# Patient Record
Sex: FEMALE | URBAN - METROPOLITAN AREA
[De-identification: names, ages, dates, MRNs, and addresses within clinical notes are randomized per-mention and may not be internally consistent; named-entity substitution may affect disease eponyms.]

---

## 2023-09-08 ENCOUNTER — APPOINTMENT (OUTPATIENT)
Dept: URBAN - METROPOLITAN AREA CLINIC 193 | Age: 63
Setting detail: DERMATOLOGY
End: 2023-09-08

## 2023-09-08 DIAGNOSIS — Z71.89 OTHER SPECIFIED COUNSELING: ICD-10-CM

## 2023-09-08 DIAGNOSIS — L81.4 OTHER MELANIN HYPERPIGMENTATION: ICD-10-CM

## 2023-09-08 DIAGNOSIS — D485 NEOPLASM OF UNCERTAIN BEHAVIOR OF SKIN: ICD-10-CM

## 2023-09-08 DIAGNOSIS — L57.0 ACTINIC KERATOSIS: ICD-10-CM

## 2023-09-08 DIAGNOSIS — L57.8 OTHER SKIN CHANGES DUE TO CHRONIC EXPOSURE TO NONIONIZING RADIATION: ICD-10-CM

## 2023-09-08 DIAGNOSIS — L82.1 OTHER SEBORRHEIC KERATOSIS: ICD-10-CM

## 2023-09-08 DIAGNOSIS — D22 MELANOCYTIC NEVI: ICD-10-CM

## 2023-09-08 PROBLEM — D48.5 NEOPLASM OF UNCERTAIN BEHAVIOR OF SKIN: Status: ACTIVE | Noted: 2023-09-08

## 2023-09-08 PROBLEM — D22.61 MELANOCYTIC NEVI OF RIGHT UPPER LIMB, INCLUDING SHOULDER: Status: ACTIVE | Noted: 2023-09-08

## 2023-09-08 PROCEDURE — 17003 DESTRUCT PREMALG LES 2-14: CPT

## 2023-09-08 PROCEDURE — OTHER MIPS QUALITY: OTHER

## 2023-09-08 PROCEDURE — 11102 TANGNTL BX SKIN SINGLE LES: CPT

## 2023-09-08 PROCEDURE — OTHER COUNSELING: OTHER

## 2023-09-08 PROCEDURE — OTHER BIOPSY BY SHAVE METHOD: OTHER

## 2023-09-08 PROCEDURE — 99203 OFFICE O/P NEW LOW 30 MIN: CPT | Mod: 25

## 2023-09-08 PROCEDURE — 17000 DESTRUCT PREMALG LESION: CPT | Mod: 59

## 2023-09-08 PROCEDURE — OTHER LIQUID NITROGEN: OTHER

## 2023-09-08 ASSESSMENT — LOCATION DETAILED DESCRIPTION DERM
LOCATION DETAILED: LEFT NASAL DORSUM
LOCATION DETAILED: LEFT ULNAR DORSAL HAND
LOCATION DETAILED: LEFT MEDIAL SUPERIOR CHEST
LOCATION DETAILED: LEFT INFERIOR ANTERIOR NECK
LOCATION DETAILED: RIGHT ANTERIOR PROXIMAL UPPER ARM
LOCATION DETAILED: LEFT PROXIMAL PRETIBIAL REGION
LOCATION DETAILED: RIGHT SUPERIOR UPPER BACK
LOCATION DETAILED: LEFT FOREHEAD

## 2023-09-08 ASSESSMENT — LOCATION ZONE DERM
LOCATION ZONE: TRUNK
LOCATION ZONE: FACE
LOCATION ZONE: HAND
LOCATION ZONE: NECK
LOCATION ZONE: LEG
LOCATION ZONE: ARM
LOCATION ZONE: NOSE

## 2023-09-08 ASSESSMENT — LOCATION SIMPLE DESCRIPTION DERM
LOCATION SIMPLE: LEFT ANTERIOR NECK
LOCATION SIMPLE: RIGHT UPPER BACK
LOCATION SIMPLE: LEFT FOREHEAD
LOCATION SIMPLE: LEFT HAND
LOCATION SIMPLE: CHEST
LOCATION SIMPLE: LEFT PRETIBIAL REGION
LOCATION SIMPLE: RIGHT UPPER ARM
LOCATION SIMPLE: NOSE

## 2023-09-08 NOTE — PROCEDURE: LIQUID NITROGEN
Show Applicator Variable?: Yes
Render Note In Bullet Format When Appropriate: No
Application Tool (Optional): Cry-AC
Post-Care Instructions: I reviewed with the patient in detail post-care instructions. Patient is to wear sunprotection, and avoid picking at any of the treated lesions. Pt may apply Vaseline to crusted or scabbing areas.
Duration Of Freeze Thaw-Cycle (Seconds): 3
Number Of Freeze-Thaw Cycles: 1 freeze-thaw cycle
Detail Level: Detailed
Consent: The patient's consent was obtained including but not limited to risks of crusting, scabbing, blistering, scarring, darker or lighter pigmentary change, recurrence, incomplete removal and infection.

## 2023-09-08 NOTE — PROCEDURE: BIOPSY BY SHAVE METHOD
Detail Level: Detailed
Depth Of Biopsy: dermis
Was A Bandage Applied: Yes
Size Of Lesion In Cm: 0.7
X Size Of Lesion In Cm: 0
Biopsy Type: H and E
Biopsy Method: Dermablade
Anesthesia Type: 0.5% lidocaine with 1:200,000 epinephrine and a 1:10 solution of 8.4% sodium bicarbonate
Anesthesia Volume In Cc (Will Not Render If 0): 0.2
Hemostasis: Electrocautery and Aluminum Chloride
Wound Care: Petrolatum
Dressing: bandage
Destruction After The Procedure: No
Type Of Destruction Used: Curettage
Curettage Text: The wound bed was treated with curettage after the biopsy was performed.
Cryotherapy Text: The wound bed was treated with cryotherapy after the biopsy was performed.
Electrodesiccation Text: The wound bed was treated with electrodesiccation after the biopsy was performed.
Electrodesiccation And Curettage Text: The wound bed was treated with electrodesiccation and curettage after the biopsy was performed.
Silver Nitrate Text: The wound bed was treated with silver nitrate after the biopsy was performed.
Lab: [id number]
Consent: Written consent was obtained and risks were reviewed including but not limited to scarring, infection, bleeding, scabbing, incomplete removal, nerve damage and allergy to anesthesia.
Post-Care Instructions: I reviewed with the patient in detail post-care instructions. Patient is to keep the biopsy site dry overnight, and then apply bacitracin twice daily until healed. Patient may apply hydrogen peroxide soaks to remove any crusting.
Notification Instructions: Patient will be notified of biopsy results. However, patient instructed to call the office if not contacted within 2 weeks.
Billing Type: Third-Party Bill
Information: Selecting Yes will display possible errors in your note based on the variables you have selected. This validation is only offered as a suggestion for you. PLEASE NOTE THAT THE VALIDATION TEXT WILL BE REMOVED WHEN YOU FINALIZE YOUR NOTE. IF YOU WANT TO FAX A PRELIMINARY NOTE YOU WILL NEED TO TOGGLE THIS TO 'NO' IF YOU DO NOT WANT IT IN YOUR FAXED NOTE.

## 2023-11-03 ENCOUNTER — APPOINTMENT (OUTPATIENT)
Dept: RADIOLOGY | Facility: CLINIC | Age: 63
End: 2023-11-03
Payer: COMMERCIAL

## 2023-11-03 ENCOUNTER — OFFICE VISIT (OUTPATIENT)
Dept: URGENT CARE | Facility: CLINIC | Age: 63
End: 2023-11-03
Payer: COMMERCIAL

## 2023-11-03 VITALS
OXYGEN SATURATION: 100 % | BODY MASS INDEX: 24.33 KG/M2 | HEART RATE: 81 BPM | HEIGHT: 67 IN | RESPIRATION RATE: 20 BRPM | SYSTOLIC BLOOD PRESSURE: 168 MMHG | WEIGHT: 155 LBS | TEMPERATURE: 98.8 F | DIASTOLIC BLOOD PRESSURE: 78 MMHG

## 2023-11-03 DIAGNOSIS — S69.92XA INJURY OF LEFT WRIST, INITIAL ENCOUNTER: ICD-10-CM

## 2023-11-03 DIAGNOSIS — S52.502A CLOSED FRACTURE OF DISTAL END OF LEFT RADIUS, UNSPECIFIED FRACTURE MORPHOLOGY, INITIAL ENCOUNTER: Primary | ICD-10-CM

## 2023-11-03 PROCEDURE — 73110 X-RAY EXAM OF WRIST: CPT

## 2023-11-03 PROCEDURE — 99203 OFFICE O/P NEW LOW 30 MIN: CPT | Performed by: FAMILY MEDICINE

## 2023-11-03 RX ORDER — DIAZEPAM 5 MG/1
5 TABLET ORAL
COMMUNITY

## 2023-11-03 RX ORDER — GABAPENTIN 300 MG/1
600 CAPSULE ORAL 3 TIMES DAILY
COMMUNITY

## 2023-11-03 RX ORDER — GABAPENTIN 600 MG/1
600 TABLET ORAL DAILY
COMMUNITY

## 2023-11-03 RX ORDER — LEVOTHYROXINE SODIUM 112 UG/1
112 TABLET ORAL
COMMUNITY

## 2023-11-03 RX ORDER — LEVOTHYROXINE SODIUM 0.12 MG/1
125 TABLET ORAL
COMMUNITY

## 2023-11-03 RX ORDER — AMLODIPINE BESYLATE 10 MG/1
10 TABLET ORAL DAILY
COMMUNITY

## 2023-11-03 RX ORDER — AZELASTINE 1 MG/ML
1 SPRAY, METERED NASAL 2 TIMES DAILY
COMMUNITY

## 2023-11-03 RX ORDER — CETIRIZINE HYDROCHLORIDE 10 MG/1
10 TABLET ORAL DAILY
COMMUNITY

## 2023-11-03 RX ORDER — DULOXETIN HYDROCHLORIDE 60 MG/1
CAPSULE, DELAYED RELEASE ORAL
COMMUNITY
Start: 2023-10-24

## 2023-11-03 RX ORDER — DESLORATADINE 5 MG/1
5 TABLET ORAL DAILY
COMMUNITY

## 2023-11-03 RX ORDER — LOSARTAN POTASSIUM 100 MG/1
100 TABLET ORAL DAILY
COMMUNITY

## 2023-11-03 RX ORDER — FAMOTIDINE 20 MG/1
1 TABLET, FILM COATED ORAL
COMMUNITY

## 2023-11-03 RX ORDER — ESOMEPRAZOLE MAGNESIUM 40 MG/1
40 CAPSULE, DELAYED RELEASE ORAL
COMMUNITY

## 2023-11-03 RX ORDER — DULOXETIN HYDROCHLORIDE 30 MG/1
30 CAPSULE, DELAYED RELEASE ORAL DAILY
COMMUNITY

## 2023-11-03 RX ORDER — CYCLOBENZAPRINE HCL 5 MG
TABLET ORAL
COMMUNITY
Start: 2023-09-08

## 2023-11-03 NOTE — PATIENT INSTRUCTIONS
Keep splint on until seen by ortho. Follow up with ortho in the next week. Treat splint like a cast.  Do not remove. Do not get wet. Tylenol as needed. Ibuprofen if swollen. Elevate to reduce swelling. Can apply ice.

## 2023-11-03 NOTE — PROGRESS NOTES
[facility]    NAME: Chuckie Avendaño is a 58 y.o. female  : 1960    MRN: [id number]  DATE: November 3, 2023  TIME: 7:01 PM    Assessment and Plan   Closed fracture of distal end of left radius, unspecified fracture morphology, initial encounter [S52.502A]  1. Closed fracture of distal end of left radius, unspecified fracture morphology, initial encounter  XR wrist 3+ vw left    Ambulatory Referral to Orthopedic Surgery    CANCELED: XR wrist 3+ vw left          Patient Instructions     Patient Instructions   Keep splint on until seen by ortho. Follow up with ortho in the next week. Treat splint like a cast.  Do not remove. Do not get wet. Tylenol as needed. Ibuprofen if swollen. Elevate to reduce swelling. Can apply ice. Chief Complaint     Chief Complaint   Patient presents with    Wrist Pain     Left wrist pain. Had a fall today around 330 while walking in woods behind house, fell on wrist- heard a snap. Pain 10/10        History of Present Illness   58year old female here with complaint of injury to left wrist.  [employer] in the jones on out stretched hand. Pain swelling of left wrist.         Review of Systems   Review of Systems   Constitutional:  Negative for chills and fever. Respiratory:  Negative for cough and shortness of breath.     Musculoskeletal:         Left wrist with deformity, swelling, pain       Current Medications     Current Outpatient Medications:     amLODIPine (NORVASC) 10 mg tablet, Take 10 mg by mouth daily, Disp: , Rfl:     azelastine (ASTELIN) 0.1 % nasal spray, 1 spray into each nostril 2 (two) times a day, Disp: , Rfl:     cetirizine (ZyrTEC) 10 mg tablet, Take 10 mg by mouth daily, Disp: , Rfl:     cyanocobalamin (VITAMIN B-12) 2500 MCG TABS, Take 2,500 mcg by mouth daily, Disp: , Rfl:     cyclobenzaprine (FLEXERIL) 5 mg tablet, , Disp: , Rfl:     desloratadine (CLARINEX) 5 MG tablet, Take 5 mg by mouth daily, Disp: , Rfl:     diazepam (VALIUM) 5 mg tablet, Take 5 mg by mouth, Disp: , Rfl:     DULoxetine (CYMBALTA) 30 mg delayed release capsule, Take 30 mg by mouth daily, Disp: , Rfl:     DULoxetine (CYMBALTA) 60 mg delayed release capsule, , Disp: , Rfl:     esomeprazole (NexIUM) 40 MG capsule, Take 40 mg by mouth, Disp: , Rfl:     famotidine (PEPCID) 20 mg tablet, Take 1 tablet by mouth, Disp: , Rfl:     gabapentin (NEURONTIN) 300 mg capsule, Take 600 mg by mouth Three times a day, Disp: , Rfl:     gabapentin (NEURONTIN) 600 MG tablet, Take 600 mg by mouth daily, Disp: , Rfl:     levothyroxine 112 mcg tablet, Take 112 mcg by mouth, Disp: , Rfl:     levothyroxine 125 mcg tablet, Take 125 mcg by mouth, Disp: , Rfl:     losartan (COZAAR) 100 MG tablet, Take 100 mg by mouth daily, Disp: , Rfl:     Current Allergies     Allergies as of 11/03/2023 - Reviewed 11/03/2023   Allergen Reaction Noted    Fentanyl GI Intolerance 01/12/2021    Codeine Other (See Comments) 02/22/2018    Belladonna Hives 05/27/2015    Chlorhexidine Hives, Itching, and Rash 09/21/2019    Other Hives 01/29/2018    Penicillins Hives 04/10/2003          The following portions of the patient's history were reviewed and updated as appropriate: allergies, current medications, past family history, past medical history, past social history, past surgical history and problem list.   History reviewed. No pertinent past medical history. Past Surgical History:   Procedure Laterality Date    CERVICAL FUSION  2023     History reviewed. No pertinent family history.   Social History     Socioeconomic History    Marital status: /Civil Union     Spouse name: Not on file    Number of children: Not on file    Years of education: Not on file    Highest education level: Not on file   Occupational History    Not on file   Tobacco Use    Smoking status: Never    Smokeless tobacco: Never   Substance and Sexual Activity    Alcohol use: Yes     Comment: social    Drug use: Yes     Comment: medical mj    Sexual activity: Not on file   Other Topics Concern    Not on file   Social History Narrative    Not on file     Social Determinants of Health     Financial Resource Strain: Not on file   Food Insecurity: Not on file   Transportation Needs: Not on file   Physical Activity: Not on file   Stress: Not on file   Social Connections: Not on file   Intimate Partner Violence: Not on file   Housing Stability: Not on file     Medications have been verified. Objective   /78   Pulse 81   Temp 98.8 °F (37.1 °C)   Resp 20   Ht 5' 7" (1.702 m)   Wt 70.3 kg (155 lb)   SpO2 100%   BMI 24.28 kg/m²      Physical Exam   Physical Exam  Vitals and nursing note reviewed. Constitutional:       Appearance: Normal appearance. HENT:      Head: Normocephalic and atraumatic. Cardiovascular:      Rate and Rhythm: Normal rate. Pulses: Normal pulses. Pulmonary:      Effort: Pulmonary effort is normal.   Musculoskeletal:      Left wrist: Swelling, deformity and tenderness present. Decreased range of motion. Normal pulse. Comments: Sensation intact to light touch grossly   Neurological:      Mental Status: She is alert.

## 2023-11-06 ENCOUNTER — APPOINTMENT (OUTPATIENT)
Dept: RADIOLOGY | Facility: CLINIC | Age: 63
End: 2023-11-06
Payer: COMMERCIAL

## 2023-11-06 ENCOUNTER — OFFICE VISIT (OUTPATIENT)
Dept: OBGYN CLINIC | Facility: CLINIC | Age: 63
End: 2023-11-06
Payer: COMMERCIAL

## 2023-11-06 VITALS
WEIGHT: 155 LBS | TEMPERATURE: 98.6 F | SYSTOLIC BLOOD PRESSURE: 135 MMHG | HEIGHT: 67 IN | BODY MASS INDEX: 24.33 KG/M2 | HEART RATE: 108 BPM | DIASTOLIC BLOOD PRESSURE: 88 MMHG

## 2023-11-06 DIAGNOSIS — S59.912A FOREARM INJURY, LEFT, INITIAL ENCOUNTER: ICD-10-CM

## 2023-11-06 DIAGNOSIS — S52.612A TRAUMATIC CLOSED FRACTURE OF ULNAR STYLOID WITH MINIMAL DISPLACEMENT, LEFT, INITIAL ENCOUNTER: ICD-10-CM

## 2023-11-06 DIAGNOSIS — S69.91XA HAND INJURY, RIGHT, INITIAL ENCOUNTER: ICD-10-CM

## 2023-11-06 DIAGNOSIS — S52.502A CLOSED FRACTURE OF DISTAL END OF LEFT RADIUS, UNSPECIFIED FRACTURE MORPHOLOGY, INITIAL ENCOUNTER: Primary | ICD-10-CM

## 2023-11-06 PROCEDURE — 73130 X-RAY EXAM OF HAND: CPT

## 2023-11-06 PROCEDURE — 99204 OFFICE O/P NEW MOD 45 MIN: CPT | Performed by: FAMILY MEDICINE

## 2023-11-06 PROCEDURE — 73090 X-RAY EXAM OF FOREARM: CPT

## 2023-11-06 NOTE — PROGRESS NOTES
[facility] [address] [address] [phone]      Assessment:  1. Closed fracture of distal end of left radius, unspecified fracture morphology, initial encounter  -     Ambulatory Referral to Orthopedic Surgery  -     DXA bone density spine hip and pelvis; Future; Expected date: 11/06/2023  -     Ambulatory Referral to Orthopedic Surgery; Future    2. Hand injury, right, initial encounter  -     XR hand 3+ vw right; Future; Expected date: 11/06/2023  -     DXA bone density spine hip and pelvis; Future; Expected date: 11/06/2023  -     Brace  -     Ambulatory Referral to Orthopedic Surgery; Future    3. Forearm injury, left, initial encounter  -     DXA bone density spine hip and pelvis; Future; Expected date: 11/06/2023  -     XR forearm 2 vw left; Future; Expected date: 11/06/2023  -     Ambulatory Referral to Orthopedic Surgery; Future    4. Traumatic closed fracture of ulnar styloid with minimal displacement, left, initial encounter  -     Ambulatory Referral to Orthopedic Surgery; Future        Plan:  Patient Instructions   F/u here as needed  Ortho hand referral- Dr. Tyrone Nuñez wearing L splint  R  ulnar gutter brace- possible 4th metacarpal base fx  Icing/heat/OTC pain meds as needed. Return for referral to Dr. Maria Esther Guardado- ortho hand, [address] [place] [address]. Chief Complaint:  Chief Complaint   Patient presents with    Left Wrist - Fracture       Subjective:   HPI    Patient ID: Jadyn De Jesus is a 58 y.o. female     Here c/o B hand pain  She was seen in . XR done. Splinted. Note reviewed  11/3/23 she was walking in woods and tripped over branch and fell on both hands. [place] L snap  Also having R hand pain as wells  Sharp pain with movement/constant  R hand- pain with extension of hand/sharp pain  Taking tylenol PRN  Took dilaudid- left over meds. LEFT WRIST     INDICATION:   S69. 92XA: Unspecified injury of left wrist, hand and finger(s), initial encounter. COMPARISON:  None     VIEWS:  XR WRIST 3+ VW LEFT  Images: 4     FINDINGS:     Acute comminuted fracture of the distal radius. There is mild dorsal angulation. There is a fracture in the ulnar styloid. Narrowing in the triscaphe joint. Narrowing at the first carpal metacarpal articulation. No lytic or blastic osseous lesion. Soft tissues are unremarkable. IMPRESSION:  Acute Colles' fracture. Review of Systems   Constitutional:  Negative for fatigue and fever. Respiratory:  Negative for shortness of breath. Cardiovascular:  Negative for chest pain. Gastrointestinal:  Negative for abdominal pain and nausea. Genitourinary:  Negative for dysuria. Musculoskeletal:  Positive for arthralgias. Skin:  Negative for rash and wound. Neurological:  Negative for weakness and headaches. Objective:  Vitals:  /88 (BP Location: Left arm, Patient Position: Sitting, Cuff Size: Standard)   Pulse (!) 108   Temp 98.6 °F (37 °C) (Tympanic)   Ht 5' 7" (1.702 m)   Wt 70.3 kg (155 lb)   BMI 24.28 kg/m²     The following portions of the patient's history were reviewed and updated as appropriate: allergies, current medications, past family history, past medical history, past social history, past surgical history, and problem list.    Physical exam:  Physical Exam  Constitutional:       Appearance: Normal appearance. She is normal weight. Eyes:      Extraocular Movements: Extraocular movements intact. Pulmonary:      Effort: Pulmonary effort is normal.   Musculoskeletal:         General: Tenderness present. Cervical back: Normal range of motion. Comments: R distal radius/ulna/ prox radius TTP  Swelling  NVI  L 4th prox metacarpal TTP  NVI   Skin:     General: Skin is warm and dry. Neurological:      General: No focal deficit present. Mental Status: She is alert and oriented to person, place, and time. Mental status is at baseline. Psychiatric:         Mood and Affect: Mood normal.         Behavior: Behavior normal.         Thought Content: Thought content normal.         Judgment: Judgment normal.       Ortho Exam    I have personally reviewed pertinent films in PACS and my interpretation is XR-  L wrist- comminuted/angulated distal radius fx.ulnar styolid fx. R hand- possible 4th prox metacarpal base fx?       Smita Kelly MD

## 2023-11-06 NOTE — PATIENT INSTRUCTIONS
F/u here as needed  Ortho hand referral- Dr. Elana Walsh wearing L splint  R  ulnar gutter brace- possible 4th metacarpal base fx  Icing/heat/OTC pain meds as needed.

## 2023-11-08 ENCOUNTER — OFFICE VISIT (OUTPATIENT)
Dept: OBGYN CLINIC | Facility: HOSPITAL | Age: 63
End: 2023-11-08
Payer: COMMERCIAL

## 2023-11-08 VITALS
SYSTOLIC BLOOD PRESSURE: 118 MMHG | HEIGHT: 67 IN | DIASTOLIC BLOOD PRESSURE: 82 MMHG | BODY MASS INDEX: 24.36 KG/M2 | HEART RATE: 93 BPM | OXYGEN SATURATION: 98 % | WEIGHT: 155.2 LBS

## 2023-11-08 DIAGNOSIS — S69.91XA HAND INJURY, RIGHT, INITIAL ENCOUNTER: ICD-10-CM

## 2023-11-08 DIAGNOSIS — G56.02 CARPAL TUNNEL SYNDROME ON LEFT: Primary | ICD-10-CM

## 2023-11-08 DIAGNOSIS — S52.502A CLOSED FRACTURE OF DISTAL END OF LEFT RADIUS, UNSPECIFIED FRACTURE MORPHOLOGY, INITIAL ENCOUNTER: ICD-10-CM

## 2023-11-08 DIAGNOSIS — S52.612A TRAUMATIC CLOSED FRACTURE OF ULNAR STYLOID WITH MINIMAL DISPLACEMENT, LEFT, INITIAL ENCOUNTER: ICD-10-CM

## 2023-11-08 PROCEDURE — 99204 OFFICE O/P NEW MOD 45 MIN: CPT | Performed by: ORTHOPAEDIC SURGERY

## 2023-11-08 RX ORDER — ZOLPIDEM TARTRATE 12.5 MG/1
12.5 TABLET, FILM COATED, EXTENDED RELEASE ORAL
COMMUNITY

## 2023-11-08 NOTE — PROGRESS NOTES
ASSESSMENT/PLAN:    Assessment:   Fracture left distal radius and ulnar styloid with dorsal angulation, intra-articular in nature, loss of radial height. Carpal tunnel syndrome chronic    Plan:   X-rays were reviewed. Non operative and operative treatment options were discussed at length. We discussed her dorsal angulation is approx. 28 degrees, which is not an optimal outcome. She is at risk for arthritis as well as loss of wrist flexion. Risks and benefit of left distal radius ORIF and open carpal tunnel release was discussed at length as she states she has know bilateral carpal tunnel syndrome and a distal radius fracture can cause symptoms to flare or worsen. Risks and benefits of surgery are listed below. Zach Castillo elected to proceed with a left distal radius ORIF and open carpal tunnel release, informed surgical consent was signed. We discussed post operative instructions/expectation, no heavy lifting, NWB, she will start therapy post operatively to work on ROM. Follow Up: After Surgery    To Do Next Visit:  X-rays of the  left  wrist and Sutures out    Operative Discussions:  Fracture Operative Treatment: The physiology of a fractured bone was discussed with the patient today. With displaced fractures, operative treatment often results in a functional recovery. Typically, these fractures undergo reduction either through percutaneous or open methods depending on the location and fracture pattern. Radiographs are typically taken at intervals throughout the fracture healing ensure maintenance of reduction and alignment. If the fracture loses its alignment, revision surgery may be required. Medical conditions such as diabetes, osteoporosis, vitamin D deficiency, and a history of or exposure to smoking may delay or prevent fracture healing.   The risks and benefits of the procedure were explained to the patient, which include, but are not limited to: Bleeding, infection, recurrence, pain, scar, malunion, nonunion, damage to tendons, damage to nerves, and damage to blood vessels, and complications related to anesthesia, failure to give desire result, need for more surgery. These risks, along with alternative conservative treatment options, and postoperative protocols were voiced back and understood by the patient. All questions were answered to the patient's satisfaction. The patient agrees to comply with a standard postoperative protocol, and is willing to proceed. Education was provided via written and auditory forms. There were no barriers to learning. Written handouts regarding wound care, incision and scar care, and general preoperative information was provided to the patient. Prior to surgery, the patient may be requested to stop all anti-inflammatory medications. Prophylactic aspirin, Plavix, and Coumadin may be allowed to be continued. Medications including vitamin E., ginkgo, and fish oil are requested to be stopped approximately one week prior to surgery. Hypertensive medications and beta blockers, if taken, should be continued. Standard Consent: The risks and benefits of the procedure were explained to the patient, which include, but are not limited to: Bleeding, infection, recurrence, pain, scar, damage to tendons, damage to nerves, and damage to blood vessels, failure to give desired results and complications related to anesthesia. These risks, along with alternative conservative treatment options, and postoperative protocols were voiced back and understood by the patient. All questions were answered to the patient's satisfaction. The patient agrees to comply with a standard postoperative protocol, and is willing to proceed. Education was provided via written and auditory forms. There were no barriers to learning. Written handouts regarding wound care, incision and scar care, and general preoperative information was provided to the patient.   Prior to surgery, the patient may be requested to stop all anti-inflammatory medications. Prophylactic aspirin, Plavix, and Coumadin may be allowed to be continued. Medications including vitamin E., ginkgo, and fish oil are requested to be stopped approximately one week prior to surgery. Hypertensive medications and beta blockers, if taken, should be continued. _____________________________________________________  CHIEF COMPLAINT:  Chief Complaint   Patient presents with    Left Wrist - Fracture     XR 11/6/23 Referred by  Dr Ayan Taylor     Right Hand - Injury     XR 11/6/23          SUBJECTIVE:  Maximino Castellanos is a 58 y.o. female who presents with a left wrist injury. I am seeing Christen Miner in consultation at the request of Dr. Ayan Taylor. She states on 11/3/23 she was hiking in the woods when she fell, injuring her left wrist. She presented to Urgent Care after the injury, at which time x-rays were performed and she was placed into a splint. She then was evaluated by Dr. Ayan Taylor at which time repeat x-rays were obtained. She states she had a hematoma to her right ring finger metacarpal area, which is  to palpation. She has a chronic right ring finger mallet deformity as she previously broke most of her fingers in the past. She notes numbness and tingling to her left hand, mainly her ulnar 2 digits but has a history of neuropathy. She states she has a history of  known bilateral carpal tunnel syndrome. Radiation: None  Handedness: right  Work status: disabled/retired      PAST MEDICAL HISTORY:  History reviewed. No pertinent past medical history. PAST SURGICAL HISTORY:  Past Surgical History:   Procedure Laterality Date    CERVICAL FUSION  2023       FAMILY HISTORY:  History reviewed. No pertinent family history.     SOCIAL HISTORY:  Social History     Tobacco Use    Smoking status: Never    Smokeless tobacco: Never   Substance Use Topics    Alcohol use: Yes     Comment: social    Drug use: Yes     Comment: medical mj MEDICATIONS:    Current Outpatient Medications:     Acetaminophen (TYLENOL 8 HOUR PO), Take by mouth, Disp: , Rfl:     amLODIPine (NORVASC) 10 mg tablet, Take 10 mg by mouth daily, Disp: , Rfl:     azelastine (ASTELIN) 0.1 % nasal spray, 1 spray into each nostril 2 (two) times a day, Disp: , Rfl:     cetirizine (ZyrTEC) 10 mg tablet, Take 10 mg by mouth daily, Disp: , Rfl:     cyanocobalamin (VITAMIN B-12) 2500 MCG TABS, Take 2,500 mcg by mouth daily, Disp: , Rfl:     cyclobenzaprine (FLEXERIL) 5 mg tablet, , Disp: , Rfl:     esomeprazole (NexIUM) 40 MG capsule, Take 40 mg by mouth, Disp: , Rfl:     levothyroxine 112 mcg tablet, Take 112 mcg by mouth, Disp: , Rfl:     losartan (COZAAR) 100 MG tablet, Take 100 mg by mouth daily, Disp: , Rfl:     zolpidem (AMBIEN CR) 12.5 MG CR tablet, Take 12.5 mg by mouth daily at bedtime as needed for sleep, Disp: , Rfl:     ALLERGIES:  Allergies   Allergen Reactions    Fentanyl GI Intolerance    Codeine Other (See Comments)    Belladonna Hives    Chlorhexidine Hives, Itching and Rash     WIPES    Other Hives    Penicillins Hives     hives   hives   hives    hives   hives    hives    hives   hives   hives   hives   hives   According to [facility] ß-lactam allergy algorithm, this is a possible   Type 1 sensitivity reaction. hives       REVIEW OF SYSTEMS:  Pertinent items are noted in HPI. A comprehensive review of systems was negative.     LABS:  HgA1c: No results found for: "HGBA1C"  BMP: No results found for: "GLUCOSE", "CALCIUM", "NA", "K", "CO2", "CL", "BUN", "CREATININE"      _____________________________________________________  PHYSICAL EXAMINATION:  Vital signs: /82   Pulse 93   Ht 5' 7" (1.702 m)   Wt 70.4 kg (155 lb 3.2 oz)   SpO2 98%   BMI 24.31 kg/m²   General: well developed and well nourished, alert, oriented times 3, and appears comfortable  Psychiatric: Normal  HEENT: Trachea Midline, No torticollis  Cardiovascular: No discernable arrhythmia  Pulmonary: No wheezing or stridor  Abdomen: No rebound or guarding  Extremities: No peripheral edema  Skin: No masses, erythema, lacerations, fluctation, ulcerations  Neurovascular: Sensation Intact to the Median, Ulnar, Radial Nerve, Motor Intact to the Median, Ulnar, Radial Nerve, and Pulses Intact    MUSCULOSKELETAL EXAMINATION:    LEFT SIDE:  Wrist: Able to flex and extend all digits, well fitting short arm splint. Neurologically intact median, ulnar, and radial nerve distribution. Capillary refill brisk. No evidence of numbness or tingling today, no signs or symptoms of compartment syndrome. _____________________________________________________  STUDIES REVIEWED:  Images were reviewed in PACS by Dr. Nichol Everett and demonstrate: left distal radius and ulnar styloid fracture with approx. 28 degrees of dorsal angulation.,  Intra-articular 3+ parts, significant comminution, loss of radial height and inclination.       PROCEDURES PERFORMED:  Procedures  No Procedures performed today    Scribe Attestation      I,:  Jeannette Boyd am acting as a scribe while in the presence of the attending physician.:       I,:  Naida Sawyer MD personally performed the services described in this documentation    as scribed in my presence.:

## 2023-11-08 NOTE — H&P (VIEW-ONLY)
ASSESSMENT/PLAN:    Assessment:   Fracture left distal radius and ulnar styloid with dorsal angulation, intra-articular in nature, loss of radial height. Carpal tunnel syndrome chronic    Plan:   X-rays were reviewed. Non operative and operative treatment options were discussed at length. We discussed her dorsal angulation is approx. 28 degrees, which is not an optimal outcome. She is at risk for arthritis as well as loss of wrist flexion. Risks and benefit of left distal radius ORIF and open carpal tunnel release was discussed at length as she states she has know bilateral carpal tunnel syndrome and a distal radius fracture can cause symptoms to flare or worsen. Risks and benefits of surgery are listed below. Saji Mina elected to proceed with a left distal radius ORIF and open carpal tunnel release, informed surgical consent was signed. We discussed post operative instructions/expectation, no heavy lifting, NWB, she will start therapy post operatively to work on ROM. Follow Up: After Surgery    To Do Next Visit:  X-rays of the  left  wrist and Sutures out    Operative Discussions:  Fracture Operative Treatment: The physiology of a fractured bone was discussed with the patient today. With displaced fractures, operative treatment often results in a functional recovery. Typically, these fractures undergo reduction either through percutaneous or open methods depending on the location and fracture pattern. Radiographs are typically taken at intervals throughout the fracture healing ensure maintenance of reduction and alignment. If the fracture loses its alignment, revision surgery may be required. Medical conditions such as diabetes, osteoporosis, vitamin D deficiency, and a history of or exposure to smoking may delay or prevent fracture healing.   The risks and benefits of the procedure were explained to the patient, which include, but are not limited to: Bleeding, infection, recurrence, pain, scar, malunion, nonunion, damage to tendons, damage to nerves, and damage to blood vessels, and complications related to anesthesia, failure to give desire result, need for more surgery. These risks, along with alternative conservative treatment options, and postoperative protocols were voiced back and understood by the patient. All questions were answered to the patient's satisfaction. The patient agrees to comply with a standard postoperative protocol, and is willing to proceed. Education was provided via written and auditory forms. There were no barriers to learning. Written handouts regarding wound care, incision and scar care, and general preoperative information was provided to the patient. Prior to surgery, the patient may be requested to stop all anti-inflammatory medications. Prophylactic aspirin, Plavix, and Coumadin may be allowed to be continued. Medications including vitamin E., ginkgo, and fish oil are requested to be stopped approximately one week prior to surgery. Hypertensive medications and beta blockers, if taken, should be continued. Standard Consent: The risks and benefits of the procedure were explained to the patient, which include, but are not limited to: Bleeding, infection, recurrence, pain, scar, damage to tendons, damage to nerves, and damage to blood vessels, failure to give desired results and complications related to anesthesia. These risks, along with alternative conservative treatment options, and postoperative protocols were voiced back and understood by the patient. All questions were answered to the patient's satisfaction. The patient agrees to comply with a standard postoperative protocol, and is willing to proceed. Education was provided via written and auditory forms. There were no barriers to learning. Written handouts regarding wound care, incision and scar care, and general preoperative information was provided to the patient.   Prior to surgery, the patient may be requested to stop all anti-inflammatory medications. Prophylactic aspirin, Plavix, and Coumadin may be allowed to be continued. Medications including vitamin E., ginkgo, and fish oil are requested to be stopped approximately one week prior to surgery. Hypertensive medications and beta blockers, if taken, should be continued. _____________________________________________________  CHIEF COMPLAINT:  Chief Complaint   Patient presents with    Left Wrist - Fracture     XR 11/6/23 Referred by  Dr Sena Gunderson     Right Hand - Injury     XR 11/6/23          SUBJECTIVE:  Jie Ludwig is a 58 y.o. female who presents with a left wrist injury. I am seeing Jennifer Choudhury in consultation at the request of Dr. Sena Gunderson. She states on 11/3/23 she was hiking in the woods when she fell, injuring her left wrist. She presented to Urgent Care after the injury, at which time x-rays were performed and she was placed into a splint. She then was evaluated by Dr. Sena Gunderson at which time repeat x-rays were obtained. She states she had a hematoma to her right ring finger metacarpal area, which is  to palpation. She has a chronic right ring finger mallet deformity as she previously broke most of her fingers in the past. She notes numbness and tingling to her left hand, mainly her ulnar 2 digits but has a history of neuropathy. She states she has a history of  known bilateral carpal tunnel syndrome. Radiation: None  Handedness: right  Work status: disabled/retired      PAST MEDICAL HISTORY:  History reviewed. No pertinent past medical history. PAST SURGICAL HISTORY:  Past Surgical History:   Procedure Laterality Date    CERVICAL FUSION  2023       FAMILY HISTORY:  History reviewed. No pertinent family history.     SOCIAL HISTORY:  Social History     Tobacco Use    Smoking status: Never    Smokeless tobacco: Never   Substance Use Topics    Alcohol use: Yes     Comment: social    Drug use: Yes     Comment: medical mj MEDICATIONS:    Current Outpatient Medications:     Acetaminophen (TYLENOL 8 HOUR PO), Take by mouth, Disp: , Rfl:     amLODIPine (NORVASC) 10 mg tablet, Take 10 mg by mouth daily, Disp: , Rfl:     azelastine (ASTELIN) 0.1 % nasal spray, 1 spray into each nostril 2 (two) times a day, Disp: , Rfl:     cetirizine (ZyrTEC) 10 mg tablet, Take 10 mg by mouth daily, Disp: , Rfl:     cyanocobalamin (VITAMIN B-12) 2500 MCG TABS, Take 2,500 mcg by mouth daily, Disp: , Rfl:     cyclobenzaprine (FLEXERIL) 5 mg tablet, , Disp: , Rfl:     esomeprazole (NexIUM) 40 MG capsule, Take 40 mg by mouth, Disp: , Rfl:     levothyroxine 112 mcg tablet, Take 112 mcg by mouth, Disp: , Rfl:     losartan (COZAAR) 100 MG tablet, Take 100 mg by mouth daily, Disp: , Rfl:     zolpidem (AMBIEN CR) 12.5 MG CR tablet, Take 12.5 mg by mouth daily at bedtime as needed for sleep, Disp: , Rfl:     ALLERGIES:  Allergies   Allergen Reactions    Fentanyl GI Intolerance    Codeine Other (See Comments)    Belladonna Hives    Chlorhexidine Hives, Itching and Rash     WIPES    Other Hives    Penicillins Hives     hives   hives   hives    hives   hives    hives    hives   hives   hives   hives   hives   According to [facility] ß-lactam allergy algorithm, this is a possible   Type 1 sensitivity reaction. hives       REVIEW OF SYSTEMS:  Pertinent items are noted in HPI. A comprehensive review of systems was negative.     LABS:  HgA1c: No results found for: "HGBA1C"  BMP: No results found for: "GLUCOSE", "CALCIUM", "NA", "K", "CO2", "CL", "BUN", "CREATININE"      _____________________________________________________  PHYSICAL EXAMINATION:  Vital signs: /82   Pulse 93   Ht 5' 7" (1.702 m)   Wt 70.4 kg (155 lb 3.2 oz)   SpO2 98%   BMI 24.31 kg/m²   General: well developed and well nourished, alert, oriented times 3, and appears comfortable  Psychiatric: Normal  HEENT: Trachea Midline, No torticollis  Cardiovascular: No discernable arrhythmia  Pulmonary: No wheezing or stridor  Abdomen: No rebound or guarding  Extremities: No peripheral edema  Skin: No masses, erythema, lacerations, fluctation, ulcerations  Neurovascular: Sensation Intact to the Median, Ulnar, Radial Nerve, Motor Intact to the Median, Ulnar, Radial Nerve, and Pulses Intact    MUSCULOSKELETAL EXAMINATION:    LEFT SIDE:  Wrist: Able to flex and extend all digits, well fitting short arm splint. Neurologically intact median, ulnar, and radial nerve distribution. Capillary refill brisk. No evidence of numbness or tingling today, no signs or symptoms of compartment syndrome. _____________________________________________________  STUDIES REVIEWED:  Images were reviewed in PACS by Dr. Alexandra De La Rosa and demonstrate: left distal radius and ulnar styloid fracture with approx. 28 degrees of dorsal angulation.,  Intra-articular 3+ parts, significant comminution, loss of radial height and inclination.       PROCEDURES PERFORMED:  Procedures  No Procedures performed today    Scribe Attestation      I,:  Bertha Johns am acting as a scribe while in the presence of the attending physician.:       I,:  Ana Martin MD personally performed the services described in this documentation    as scribed in my presence.:

## 2023-11-09 ENCOUNTER — ANESTHESIA EVENT (OUTPATIENT)
Dept: PERIOP | Facility: HOSPITAL | Age: 63
End: 2023-11-09
Payer: COMMERCIAL

## 2023-11-09 RX ORDER — CLINDAMYCIN PHOSPHATE 900 MG/50ML
900 INJECTION INTRAVENOUS ONCE
Status: CANCELLED | OUTPATIENT
Start: 2023-11-09 | End: 2023-11-09

## 2023-11-09 NOTE — PRE-PROCEDURE INSTRUCTIONS
Pre-Surgery Instructions:   Medication Instructions    Acetaminophen (TYLENOL 8 HOUR PO) Uses PRN- OK to take day of surgery    amLODIPine (NORVASC) 10 mg tablet Take day of surgery. azelastine (ASTELIN) 0.1 % nasal spray Uses PRN- OK to take day of surgery    cetirizine (ZyrTEC) 10 mg tablet Hold day of surgery. cyanocobalamin (VITAMIN B-12) 2500 MCG TABS Stop taking    cyclobenzaprine (FLEXERIL) 5 mg tablet Uses PRN- OK to take day of surgery    esomeprazole (NexIUM) 40 MG capsule Take day of surgery. levothyroxine 112 mcg tablet Take day of surgery. losartan (COZAAR) 100 MG tablet Hold day of surgery. zolpidem (AMBIEN CR) 12.5 MG CR tablet Take night before surgery   Medication instructions for day surgery reviewed. Please use only a sip of water to take your instructed medications. Avoid all over the counter vitamins, supplements and NSAIDS for one week prior to surgery per anesthesia guidelines. Tylenol is ok to take as needed. You will receive a call one business day prior to surgery with an arrival time and hospital directions. If your surgery is scheduled on a Monday, the hospital will be calling you on the Friday prior to your surgery. If you have not heard from anyone by 8pm, please call the hospital supervisor through the hospital  at [phone]. Graeme Nicholselvi [phone]). Do not eat or drink anything after midnight the night before your surgery, including candy, mints, lifesavers, or chewing gum. Do not drink alcohol 24hrs before your surgery. Try not to smoke at least 24hrs before your surgery. Follow the pre surgery showering instructions as listed in the [facility] Surgical Experience Booklet” or otherwise provided by your surgeon's office. Do not use a blade to shave the surgical area 1 week before surgery. It is okay to use a clean electric clippers up to 24 hours before surgery.  Do not apply any lotions, creams, including makeup, cologne, deodorant, or perfumes after showering on the day of your surgery. Do not use dry shampoo, hair spray, hair gel, or any type of hair products. No contact lenses, eye make-up, or artificial eyelashes. Remove nail polish, including gel polish, and any artificial, gel, or acrylic nails if possible. Remove all jewelry including rings and body piercing jewelry. Wear causal clothing that is easy to take on and off. Consider your type of surgery. Keep any valuables, jewelry, piercings at home. Please bring any specially ordered equipment (sling, braces) if indicated. Arrange for a responsible person to drive you to and from the hospital on the day of your surgery. Visitor Guidelines discussed. Call the surgeon's office with any new illnesses, exposures, or additional questions prior to surgery. Please reference your [facility] Surgical Experience Booklet” for additional information to prepare for your upcoming surgery.

## 2023-11-10 ENCOUNTER — APPOINTMENT (OUTPATIENT)
Dept: LAB | Facility: CLINIC | Age: 63
End: 2023-11-10
Payer: COMMERCIAL

## 2023-11-10 DIAGNOSIS — Z01.818 PREOP TESTING: ICD-10-CM

## 2023-11-10 DIAGNOSIS — Z01.818 PREOP TESTING: Primary | ICD-10-CM

## 2023-11-10 LAB
ANION GAP SERPL CALCULATED.3IONS-SCNC: 7 MMOL/L
BASOPHILS # BLD AUTO: 0.05 THOUSANDS/ÂΜL (ref 0–0.1)
BASOPHILS NFR BLD AUTO: 1 % (ref 0–1)
BUN SERPL-MCNC: 12 MG/DL (ref 5–25)
CALCIUM SERPL-MCNC: 9.5 MG/DL (ref 8.4–10.2)
CHLORIDE SERPL-SCNC: 101 MMOL/L (ref 96–108)
CO2 SERPL-SCNC: 27 MMOL/L (ref 21–32)
CREAT SERPL-MCNC: 0.69 MG/DL (ref 0.6–1.3)
EOSINOPHIL # BLD AUTO: 0.06 THOUSAND/ÂΜL (ref 0–0.61)
EOSINOPHIL NFR BLD AUTO: 1 % (ref 0–6)
ERYTHROCYTE [DISTWIDTH] IN BLOOD BY AUTOMATED COUNT: 12.6 % (ref 11.6–15.1)
GFR SERPL CREATININE-BSD FRML MDRD: 92 ML/MIN/1.73SQ M
GLUCOSE SERPL-MCNC: 100 MG/DL (ref 65–140)
HCT VFR BLD AUTO: 39 % (ref 34.8–46.1)
HGB BLD-MCNC: 13.3 G/DL (ref 11.5–15.4)
IMM GRANULOCYTES # BLD AUTO: 0.02 THOUSAND/UL (ref 0–0.2)
IMM GRANULOCYTES NFR BLD AUTO: 0 % (ref 0–2)
LYMPHOCYTES # BLD AUTO: 2.39 THOUSANDS/ÂΜL (ref 0.6–4.47)
LYMPHOCYTES NFR BLD AUTO: 32 % (ref 14–44)
MCH RBC QN AUTO: 32.4 PG (ref 26.8–34.3)
MCHC RBC AUTO-ENTMCNC: 34.1 G/DL (ref 31.4–37.4)
MCV RBC AUTO: 95 FL (ref 82–98)
MONOCYTES # BLD AUTO: 0.47 THOUSAND/ÂΜL (ref 0.17–1.22)
MONOCYTES NFR BLD AUTO: 6 % (ref 4–12)
NEUTROPHILS # BLD AUTO: 4.39 THOUSANDS/ÂΜL (ref 1.85–7.62)
NEUTS SEG NFR BLD AUTO: 60 % (ref 43–75)
NRBC BLD AUTO-RTO: 0 /100 WBCS
PLATELET # BLD AUTO: 415 THOUSANDS/UL (ref 149–390)
PMV BLD AUTO: 10.1 FL (ref 8.9–12.7)
POTASSIUM SERPL-SCNC: 4.1 MMOL/L (ref 3.5–5.3)
RBC # BLD AUTO: 4.1 MILLION/UL (ref 3.81–5.12)
SODIUM SERPL-SCNC: 135 MMOL/L (ref 135–147)
WBC # BLD AUTO: 7.38 THOUSAND/UL (ref 4.31–10.16)

## 2023-11-10 PROCEDURE — 85025 COMPLETE CBC W/AUTO DIFF WBC: CPT

## 2023-11-10 PROCEDURE — 36415 COLL VENOUS BLD VENIPUNCTURE: CPT

## 2023-11-10 PROCEDURE — 80048 BASIC METABOLIC PNL TOTAL CA: CPT

## 2023-11-14 ENCOUNTER — ANESTHESIA (OUTPATIENT)
Dept: PERIOP | Facility: HOSPITAL | Age: 63
End: 2023-11-14
Payer: COMMERCIAL

## 2023-11-14 ENCOUNTER — HOSPITAL ENCOUNTER (OUTPATIENT)
Dept: RADIOLOGY | Facility: HOSPITAL | Age: 63
Setting detail: OUTPATIENT SURGERY
Discharge: HOME | End: 2023-11-14
Payer: COMMERCIAL

## 2023-11-14 ENCOUNTER — HOSPITAL ENCOUNTER (OUTPATIENT)
Facility: HOSPITAL | Age: 63
Setting detail: OUTPATIENT SURGERY
Discharge: HOME | End: 2023-11-14
Attending: ORTHOPAEDIC SURGERY | Admitting: ORTHOPAEDIC SURGERY
Payer: COMMERCIAL

## 2023-11-14 VITALS
HEIGHT: 67 IN | WEIGHT: 155 LBS | HEART RATE: 79 BPM | DIASTOLIC BLOOD PRESSURE: 79 MMHG | OXYGEN SATURATION: 98 % | RESPIRATION RATE: 18 BRPM | BODY MASS INDEX: 24.33 KG/M2 | SYSTOLIC BLOOD PRESSURE: 141 MMHG | TEMPERATURE: 98 F

## 2023-11-14 DIAGNOSIS — S52.502G CLOSED FRACTURE OF DISTAL END OF LEFT RADIUS WITH DELAYED HEALING, SUBSEQUENT ENCOUNTER: ICD-10-CM

## 2023-11-14 DIAGNOSIS — S52.572A OTHER CLOSED INTRA-ARTICULAR FRACTURE OF DISTAL END OF LEFT RADIUS, INITIAL ENCOUNTER: Primary | ICD-10-CM

## 2023-11-14 LAB
ATRIAL RATE: 85 BPM
P AXIS: 78 DEGREES
PR INTERVAL: 150 MS
QRS AXIS: 66 DEGREES
QRSD INTERVAL: 94 MS
QT INTERVAL: 376 MS
QTC INTERVAL: 447 MS
T WAVE AXIS: 62 DEGREES
VENTRICULAR RATE: 85 BPM

## 2023-11-14 PROCEDURE — C1713 ANCHOR/SCREW BN/BN,TIS/BN: HCPCS | Performed by: ORTHOPAEDIC SURGERY

## 2023-11-14 PROCEDURE — 64721 CARPAL TUNNEL SURGERY: CPT | Performed by: PHYSICIAN ASSISTANT

## 2023-11-14 PROCEDURE — 64721 CARPAL TUNNEL SURGERY: CPT | Performed by: ORTHOPAEDIC SURGERY

## 2023-11-14 PROCEDURE — 73110 X-RAY EXAM OF WRIST: CPT

## 2023-11-14 PROCEDURE — 93005 ELECTROCARDIOGRAM TRACING: CPT

## 2023-11-14 PROCEDURE — C9290 INJ, BUPIVACAINE LIPOSOME: HCPCS | Performed by: ANESTHESIOLOGY

## 2023-11-14 PROCEDURE — 25609 OPTX DST RD XART FX/EP SEP3+: CPT | Performed by: PHYSICIAN ASSISTANT

## 2023-11-14 PROCEDURE — 25609 OPTX DST RD XART FX/EP SEP3+: CPT | Performed by: ORTHOPAEDIC SURGERY

## 2023-11-14 PROCEDURE — 93010 ELECTROCARDIOGRAM REPORT: CPT | Performed by: INTERNAL MEDICINE

## 2023-11-14 DEVICE — PEG VOLAR THREADED 20MM: Type: IMPLANTABLE DEVICE | Site: WRIST | Status: FUNCTIONAL

## 2023-11-14 DEVICE — PLATE BONE VOLAR FIXED ANGLE 3 HOLE LEFT: Type: IMPLANTABLE DEVICE | Site: WRIST | Status: FUNCTIONAL

## 2023-11-14 DEVICE — SCREW LCK 3.2 X 14MM CORTICAL: Type: IMPLANTABLE DEVICE | Site: WRIST | Status: FUNCTIONAL

## 2023-11-14 DEVICE — PEG VOLAR THREADED 18MM: Type: IMPLANTABLE DEVICE | Site: WRIST | Status: FUNCTIONAL

## 2023-11-14 DEVICE — PEG VOLAR 18MM UNTHREADED: Type: IMPLANTABLE DEVICE | Site: WRIST | Status: FUNCTIONAL

## 2023-11-14 DEVICE — SCREW CORTICAL 3.2 X 13MM: Type: IMPLANTABLE DEVICE | Site: WRIST | Status: FUNCTIONAL

## 2023-11-14 DEVICE — PEG VOLAR THREADED 16MM: Type: IMPLANTABLE DEVICE | Site: WRIST | Status: FUNCTIONAL

## 2023-11-14 DEVICE — PEG VOLAR 20MM UNTHREADED: Type: IMPLANTABLE DEVICE | Site: WRIST | Status: FUNCTIONAL

## 2023-11-14 RX ORDER — TRAMADOL HYDROCHLORIDE 50 MG/1
50 TABLET ORAL EVERY 6 HOURS PRN
Status: DISCONTINUED | OUTPATIENT
Start: 2023-11-14 | End: 2023-11-14 | Stop reason: HOSPADM

## 2023-11-14 RX ORDER — BUPIVACAINE HYDROCHLORIDE 5 MG/ML
INJECTION, SOLUTION EPIDURAL; INTRACAUDAL AS NEEDED
Status: DISCONTINUED | OUTPATIENT
Start: 2023-11-14 | End: 2023-11-14

## 2023-11-14 RX ORDER — PROPOFOL 10 MG/ML
INJECTION, EMULSION INTRAVENOUS CONTINUOUS PRN
Status: DISCONTINUED | OUTPATIENT
Start: 2023-11-14 | End: 2023-11-14

## 2023-11-14 RX ORDER — ONDANSETRON 2 MG/ML
4 INJECTION INTRAMUSCULAR; INTRAVENOUS ONCE AS NEEDED
Status: CANCELLED | OUTPATIENT
Start: 2023-11-14

## 2023-11-14 RX ORDER — CLINDAMYCIN PHOSPHATE 900 MG/50ML
900 INJECTION INTRAVENOUS ONCE
Status: COMPLETED | OUTPATIENT
Start: 2023-11-14 | End: 2023-11-14

## 2023-11-14 RX ORDER — SODIUM CHLORIDE, SODIUM LACTATE, POTASSIUM CHLORIDE, CALCIUM CHLORIDE 600; 310; 30; 20 MG/100ML; MG/100ML; MG/100ML; MG/100ML
50 INJECTION, SOLUTION INTRAVENOUS CONTINUOUS
Status: DISCONTINUED | OUTPATIENT
Start: 2023-11-14 | End: 2023-11-14 | Stop reason: HOSPADM

## 2023-11-14 RX ORDER — MIDAZOLAM HYDROCHLORIDE 2 MG/2ML
INJECTION, SOLUTION INTRAMUSCULAR; INTRAVENOUS AS NEEDED
Status: DISCONTINUED | OUTPATIENT
Start: 2023-11-14 | End: 2023-11-14

## 2023-11-14 RX ORDER — LIDOCAINE HYDROCHLORIDE 10 MG/ML
INJECTION, SOLUTION EPIDURAL; INFILTRATION; INTRACAUDAL; PERINEURAL AS NEEDED
Status: DISCONTINUED | OUTPATIENT
Start: 2023-11-14 | End: 2023-11-14

## 2023-11-14 RX ORDER — METOCLOPRAMIDE HYDROCHLORIDE 5 MG/ML
10 INJECTION INTRAMUSCULAR; INTRAVENOUS ONCE AS NEEDED
Status: CANCELLED | OUTPATIENT
Start: 2023-11-14

## 2023-11-14 RX ORDER — SENNOSIDES 8.6 MG
650 CAPSULE ORAL EVERY 8 HOURS PRN
Qty: 30 TABLET | Refills: 0 | Status: SHIPPED | OUTPATIENT
Start: 2023-11-14

## 2023-11-14 RX ORDER — ONDANSETRON 2 MG/ML
INJECTION INTRAMUSCULAR; INTRAVENOUS AS NEEDED
Status: DISCONTINUED | OUTPATIENT
Start: 2023-11-14 | End: 2023-11-14

## 2023-11-14 RX ORDER — TRAMADOL HYDROCHLORIDE 50 MG/1
50 TABLET ORAL EVERY 6 HOURS PRN
Qty: 5 TABLET | Refills: 0 | Status: SHIPPED | OUTPATIENT
Start: 2023-11-14

## 2023-11-14 RX ORDER — ACETAMINOPHEN 325 MG/1
650 TABLET ORAL EVERY 6 HOURS PRN
Status: DISCONTINUED | OUTPATIENT
Start: 2023-11-14 | End: 2023-11-14 | Stop reason: HOSPADM

## 2023-11-14 RX ORDER — DEXAMETHASONE SODIUM PHOSPHATE 10 MG/ML
INJECTION, SOLUTION INTRAMUSCULAR; INTRAVENOUS AS NEEDED
Status: DISCONTINUED | OUTPATIENT
Start: 2023-11-14 | End: 2023-11-14

## 2023-11-14 RX ORDER — COVID-19 ANTIGEN TEST
220 KIT MISCELLANEOUS 2 TIMES DAILY
Qty: 60 CAPSULE | Refills: 0 | Status: SHIPPED | OUTPATIENT
Start: 2023-11-14 | End: 2023-12-14

## 2023-11-14 RX ORDER — LIDOCAINE HYDROCHLORIDE 10 MG/ML
0.5 INJECTION, SOLUTION EPIDURAL; INFILTRATION; INTRACAUDAL; PERINEURAL ONCE AS NEEDED
Status: DISCONTINUED | OUTPATIENT
Start: 2023-11-14 | End: 2023-11-14 | Stop reason: HOSPADM

## 2023-11-14 RX ORDER — ONDANSETRON 2 MG/ML
4 INJECTION INTRAMUSCULAR; INTRAVENOUS EVERY 6 HOURS PRN
Status: DISCONTINUED | OUTPATIENT
Start: 2023-11-14 | End: 2023-11-14 | Stop reason: HOSPADM

## 2023-11-14 RX ADMIN — DEXAMETHASONE SODIUM PHOSPHATE 10 MG: 10 INJECTION, SOLUTION INTRAMUSCULAR; INTRAVENOUS at 11:58

## 2023-11-14 RX ADMIN — ONDANSETRON 4 MG: 2 INJECTION INTRAMUSCULAR; INTRAVENOUS at 11:58

## 2023-11-14 RX ADMIN — SODIUM CHLORIDE, SODIUM LACTATE, POTASSIUM CHLORIDE, AND CALCIUM CHLORIDE 50 ML/HR: .6; .31; .03; .02 INJECTION, SOLUTION INTRAVENOUS at 09:16

## 2023-11-14 RX ADMIN — PROPOFOL 200 MG: 10 INJECTION, EMULSION INTRAVENOUS at 11:54

## 2023-11-14 RX ADMIN — CLINDAMYCIN PHOSPHATE 900 MG: 900 INJECTION, SOLUTION INTRAVENOUS at 11:53

## 2023-11-14 RX ADMIN — BUPIVACAINE 20 ML: 13.3 INJECTION, SUSPENSION, LIPOSOMAL INFILTRATION at 11:31

## 2023-11-14 RX ADMIN — BUPIVACAINE HYDROCHLORIDE 7.5 ML: 5 INJECTION, SOLUTION EPIDURAL; INTRACAUDAL; PERINEURAL at 11:31

## 2023-11-14 RX ADMIN — PROPOFOL 150 MCG/KG/MIN: 10 INJECTION, EMULSION INTRAVENOUS at 11:55

## 2023-11-14 RX ADMIN — LIDOCAINE HYDROCHLORIDE 50 MG: 10 INJECTION, SOLUTION EPIDURAL; INFILTRATION; INTRACAUDAL; PERINEURAL at 11:54

## 2023-11-14 RX ADMIN — MIDAZOLAM 2 MG: 1 INJECTION INTRAMUSCULAR; INTRAVENOUS at 11:22

## 2023-11-14 NOTE — INTERVAL H&P NOTE
H&P reviewed. After examining the patient I find no changes in the patients condition since the H&P had been written.     Vitals:    11/14/23 0843   BP: 140/76   Pulse: 85   Resp: 18   Temp: 98.4 °F (36.9 °C)   SpO2: 99%

## 2023-11-14 NOTE — OP NOTE
OPERATIVE REPORT  PATIENT NAME: Pollo Landeros  :  1960  MRN: [id number]  Pt Location: [facility]    SURGERY DATE: 23    Surgeon(s) and Role:     * Naida Sawyer MD - Primary     * Amari Noriega PA-C - Assisting    Pre-Op Diagnosis:  Left intra-articular 3+ part distal radius fracture on the left  Carpal tunnel syndrome on left [G56.02]    Post-Op Diagnosis:  Left intra-articular 3+ part distal radius fracture on the left  Carpal tunnel syndrome on left [G56.02]    Procedure(s) (LRB):  Open reduction internal fixation left intra-articular 3+ part distal radius fracture (Left)  Left open carpal tunnel release (Left)  Application left short arm splint (Left)    Specimen(s):  No specimens collected during this procedure. Estimated Blood Loss:   Minimal      Anesthesia Type:   General    Operative Indications: The patient has a history of left intra-articular distal radius fracture 3+ part with carpal tunnel syndrome that was recalcitrant to conservative management. The decision was made to bring the patient to the operating room for open reduction internal fixation left distal radius fracture and open left carpal tunnel release. Risks of the procedure were explained which include, but are not limited to bleeding; infection; damage to nerves, arteries,veins, tendons; scar; pain; need for reoperation; failure to give desired result; and risks of anaesthesia. All questions were answered to satisfaction and they were willing to proceed. Operative Findings:  Left intra-articular 3+ part distal radius fracture  Left carpal tunnel syndrome    Complications:   None    Procedure and Technique:  After the patient, site, and procedure were identified, the patient was brought into the operating room in a supine position. Regional and general anaesthesia were provided. A well padded tourniquet was applied to the extremity, set at 250 mmHg.   The  left upper extremity was then prepped and drapped in a normal, sterile, orthopedic fashion. After the patient, site, and procedure identified attention was turned towards the left arm. An Esmarch bandage was used to exsanguinate the limb and the tourniquet was inflated to 250 mmHg. A standard volar trans-FCR approach to the left distal radius was then made. We dissected down through the skin and subcutaneous tissues maintaining hemostasis. Care was taken to protect the radial artery, superficial sensory branch of the radial nerve, palmar cutaneous branch of the median nerve, and median nerve proper. The flexor carpi radialis tendon was retracted. The flexor pollicis longus tendon was retracted ulnarly, and the pronator quadratus was opened in a proximal and ulnar based flap. This delivered us down to the level of the distal radius. We identified 3 separate fracture fragments inclusive of the shaft, a volar distal portion and a dorsal distal portion. Reduction was then performed and provisionally held with K wire fixation through the radial styloid. A TriMed 3-hole fixed angle locking distal radial plate was then selected. This was provisionally held with K wire fixation. AP, lateral, articular views and obliques were taken which confirmed good reduction and plate placement. At this point in time, the plate was secured to the shaft proximally with 2 bicortical nonlocking screws and 1 bicortical locking screw. Distally, the plate was secured to the bone with a bicortical nonlocking screw to bring the bone to the plate. We then placed 3 distal unicortical fully threaded locking screws and 3 more proximal unicortical smooth locking pegs. The provisional reduction screw on the distal ulnar portion was then switched for a locking fully threaded screw. Provisional K wires were removed.   AP, lateral, and articular views as well as obliques were taken which confirmed good reduction, restoration of anatomic alignment with restoration of radial height, radial inclination, and volar tilt. The patient had good hardware placement. The patient had full wrist flexion, extension, pronation, supination, and a stable distal radial ulnar joint. After the patient, site, and procedure were once again identified, attention was turned towards the left carpal tunnel. A linear incision was then made radial on the palmar aspect of the hand in line with the webspace between the long and ring fingers. This was extended from the level of Santamaria's cardinal line to the level of the wrist flexion crease. .  This is extended in a Bunny type incision towards the wrist flexion crease. With care taken to identify and protect superficial neurovascular structures, the skin, subcutaneous fat, and palmar fascia were divided in line with the incision. Under direct visualization, the transverse carpal ligament was divided from proximal to distal.  We confirmed complete release under direct visualization and released distally to the level of the palmar fat. Care was taken to protect the recurrent branch of the median nerve. We were able to identify the median nerve which was intact in its entirety as well as the flexor tendons within the canal.  We then divided the antebrachial fascia and a proximal and ulnar based direction for approximately 2 cm. At the completion of the procedure, hemostasis was obtained with cautery and direct pressure. The wounds were copiously irrigated with sterile solution. The wounds were closed with Vicryl and Prolene. Sterile dressings were applied, including Xeroform, gauze, tweeners, webril, ACE and Volar Splint. Please note, all sponge, needle, and instrument counts were correct prior to closure. Loupe magnification was utilized. The patient tolerated the procedure well. I was present for all critical portions of the procedure., A qualified resident physician was not available. , and A physician assistant was required during the procedure for retraction, tissue handling, dissection and suturing.     Patient Disposition:  PACU , hemodynamically stable, and extubated and stable    SIGNATURE: Yanet López MD  DATE: 11/14/23  TIME: 12:59 PM

## 2023-11-14 NOTE — ANESTHESIA PREPROCEDURE EVALUATION
Procedure:  Open reduction internal fixation left intra-articular 3+ part distal radius fracture (Left: Wrist)  Left open carpal tunnel release (Left: Wrist)  Application left short arm splint (Left: Wrist)    Relevant Problems   No relevant active problems    PONV (postoperative nausea and vomiting)         Sleep apnea      Disease of thyroid gland  Hypertension    GERD (gastroesophageal reflux disease)  Arthritis    Cancer (HCC) R breast         Physical Exam    Airway    Mallampati score: II  TM Distance: >3 FB  Neck ROM: full     Dental       Cardiovascular      Pulmonary      Other Findings        Anesthesia Plan  ASA Score- 2     Anesthesia Type- general with ASA Monitors. Additional Monitors:     Airway Plan: LMA. Comment: SC block for post op pain per surgeon request. .       Plan Factors-    Chart reviewed. Induction- intravenous. Postoperative Plan-     Informed Consent- Anesthetic plan and risks discussed with patient. I personally reviewed this patient with the CRNA. Discussed and agreed on the Anesthesia Plan with the CRNA. Coco Cohen

## 2023-11-14 NOTE — ANESTHESIA PROCEDURE NOTES
Peripheral Block    Patient location during procedure: holding area  Start time: 11/14/2023 11:31 AM  Reason for block: at surgeon's request and post-op pain management  Staffing  Performed by: Mary Anders MD  Authorized by: Mary Anders MD    Preanesthetic Checklist  Completed: patient identified, IV checked, site marked, risks and benefits discussed, surgical consent, monitors and equipment checked, pre-op evaluation and timeout performed  Peripheral Block  Patient position: supine  Prep: ChloraPrep  Patient monitoring: frequent blood pressure checks, continuous pulse oximetry and heart rate  Block type: Supraclavicular  Laterality: left  Injection technique: single-shot  Procedures: ultrasound guided, Ultrasound guidance required for the procedure to increase accuracy and safety of medication placement and decrease risk of complications.   Ultrasound permanent image saved  Needle  Needle type: Stimuplex   Needle gauge: 20 G  Needle length: 2 in  Needle localization: anatomical landmarks and ultrasound guidance  Assessment  Injection assessment: incremental injection, frequent aspiration, injected with ease, negative aspiration, negative for heart rate change, no paresthesia on injection, no symptoms of intraneural/intravenous injection and needle tip visualized at all times  Paresthesia pain: none  Post-procedure:  site cleaned  patient tolerated the procedure well with no immediate complications

## 2023-11-14 NOTE — ANESTHESIA POSTPROCEDURE EVALUATION
Post-Op Assessment Note    CV Status:  Stable  Pain Score: 0    Pain management: adequate       Mental Status:  Alert and awake   Hydration Status:  Euvolemic   PONV Controlled:  Controlled   Airway Patency:  Patent     Post Op Vitals Reviewed: Yes    No anethesia notable event occurred.     Staff: CRNA           BP (!) 182/88 (11/14/23 1335)    Temp 98 °F (36.7 °C) (11/14/23 1335)    Pulse 82 (11/14/23 1335)   Resp 18 (11/14/23 1335)    SpO2 98 % (11/14/23 1335)

## 2023-11-14 NOTE — DISCHARGE INSTR - AVS FIRST PAGE
Post Operative Instructions    You have had surgery on your arm today, please read and follow the information below:  Elevate your hand above your elbow during the next 24-48 hours to help with swelling. Place your hand and arm over your head with motion at your shoulder three times a day. Do not apply any cream/ointment/oil to your incisions including antibiotics. Do not soak your hands in standing water (dishwater, tubs, Jacuzzi's, pools, etc.) until given permission (typically 2-3 weeks after injury)    Call the office if you notice any:  Increased numbness or tingling of your hand or fingers that is not relieved with elevation. Increasing pain that is not controlled with medication. Difficulty chewing, breathing, swallowing. Chest pains or shortness of breath. Fever over 101.4 degrees. Bandage: Do NOT remove bandage until follow-up appointment. Motion: Move fingers into a fist 5 times a day, DO NOT move any splinted fingers. Weight bearing status: The operated extremity should be non-weight bearing until further notice. Ice: Ice for 10 minutes every hour as needed for swelling x 24 hours. Sling: Sling for comfort for 2-3 days. Medications:   Naproxen 220 mg two times a day   Tylenol Extended Release 650 mg every 8 hours  Tramadol 1 tab every 6 hours AS needed for pain    After surgery, we would like you to take naproxen 220 mg one tablet by mouth every 12 hours with food  AND Tylenol 650 mg one tablet by mouth every 8 hours  (at breakfast, lunch and dinner) for 3-5 days after your surgery. Please take these medication EVERYDAY after surgery for 3-5 days, and not just as needed. You can take these medications at the same time. Taking these medications after surgery will limit your need for prescription pain medication. We will also prescribe a narcotic pain medication for a limited time after surgery that you can take as needed for moderate or severe pain.       Follow-up Appointment: 7-10 days. Please call the office if you have any questions or concerns regarding your post-operative care.

## 2023-11-27 ENCOUNTER — OFFICE VISIT (OUTPATIENT)
Dept: OBGYN CLINIC | Facility: CLINIC | Age: 63
End: 2023-11-27

## 2023-11-27 ENCOUNTER — OFFICE VISIT (OUTPATIENT)
Dept: OCCUPATIONAL THERAPY | Facility: CLINIC | Age: 63
End: 2023-11-27
Payer: COMMERCIAL

## 2023-11-27 ENCOUNTER — APPOINTMENT (OUTPATIENT)
Dept: RADIOLOGY | Facility: CLINIC | Age: 63
End: 2023-11-27
Payer: COMMERCIAL

## 2023-11-27 VITALS — WEIGHT: 155 LBS | HEIGHT: 67 IN | BODY MASS INDEX: 24.33 KG/M2

## 2023-11-27 DIAGNOSIS — Z48.89 AFTERCARE FOLLOWING SURGERY: Primary | ICD-10-CM

## 2023-11-27 DIAGNOSIS — Z48.89 AFTERCARE FOLLOWING SURGERY: ICD-10-CM

## 2023-11-27 DIAGNOSIS — S52.522D CLOSED TORUS FRACTURE OF DISTAL END OF LEFT RADIUS WITH ROUTINE HEALING, SUBSEQUENT ENCOUNTER: Primary | ICD-10-CM

## 2023-11-27 PROCEDURE — 73110 X-RAY EXAM OF WRIST: CPT

## 2023-11-27 PROCEDURE — 99024 POSTOP FOLLOW-UP VISIT: CPT | Performed by: PHYSICIAN ASSISTANT

## 2023-11-27 PROCEDURE — L3906 WHO W/O JOINTS CF: HCPCS | Performed by: OCCUPATIONAL THERAPIST

## 2023-11-27 NOTE — PROGRESS NOTES
Assessment:   S/P Open reduction internal fixation left intra-articular 3+ part distal radius fracture - Left, Left open carpal tunnel release - Left, and Application left short arm splint - Left on 11/14/2023    Plan:   Patient was advised to obtain a custom splint from occupational therapy  She was encouraged to work on range of motion at this time  She was advised to avoid any significant heavy lifting at this time  She was advised to allow the incision to finish healing and to perform heat massage as demonstrated in the office  She will follow-up in 4 weeks for reevaluation and x-rays of the left wrist    Follow Up:  4  week(s)    To Do Next Visit:  X-rays of the  left  wrist      CHIEF COMPLAINT:  Chief Complaint   Patient presents with    Left Wrist - Post-op     S/P ORIF, L ECTR 11/14/23          SUBJECTIVE:  Taran Conti is a 58 y.o. female who presents for follow up after Open reduction internal fixation left intra-articular 3+ part distal radius fracture - Left, Left open carpal tunnel release - Left, and Application left short arm splint - Left on 11/14/2023. Today patient has improvement in the numbness and tingling symptoms. She states she has been working on gentle range of motion of the digits. PHYSICAL EXAMINATION:  Vital signs: Ht 5' 7" (1.702 m)   Wt 70.3 kg (155 lb)   BMI 24.28 kg/m²   General: well developed and well nourished, alert, oriented times 3, and appears comfortable  Psychiatric: Normal    MUSCULOSKELETAL EXAMINATION:  Incision: Clean, dry, intact  Range of Motion: As expected and Limited due to stiffness  Neurovascular status: Neuro intact, good cap refill  Activity Restrictions: No restrictions  Done today: Sutures out      STUDIES REVIEWED:  X-rays of the left wrist were reviewed which demonstrated hardware in appropriate alignment without evidence of loosening.       PROCEDURES PERFORMED:  Procedures  No Procedures performed today

## 2023-11-27 NOTE — PROGRESS NOTES
Orthosis    Diagnosis:   1. Closed torus fracture of distal end of left radius with routine healing, subsequent encounter          Indication: Fracture    Location: L wrist   Supplies: Custom Fit Orthotic and Skin coverage  and HEP  Orthosis type: Wrist splint  Wearing Schedule: Remove for hygiene only  Describe Position: function    Precautions: Fracture    Patient or Caregiver expresses understanding of wearing Schedule and Precautions? Yes  Patient or Caregiver able to don/doff orthotic independently? Yes    Written orders provided to patient?  Yes  Orders Obtained: Written  Orders Obtained from: Sacha Benitez [address]     Return for evaluation and treatment Yes

## 2023-12-05 ENCOUNTER — EVALUATION (OUTPATIENT)
Dept: OCCUPATIONAL THERAPY | Facility: CLINIC | Age: 63
End: 2023-12-05
Payer: COMMERCIAL

## 2023-12-05 DIAGNOSIS — S52.522D CLOSED TORUS FRACTURE OF DISTAL END OF LEFT RADIUS WITH ROUTINE HEALING, SUBSEQUENT ENCOUNTER: Primary | ICD-10-CM

## 2023-12-05 DIAGNOSIS — Z48.89 AFTERCARE FOLLOWING SURGERY: ICD-10-CM

## 2023-12-05 PROCEDURE — 97165 OT EVAL LOW COMPLEX 30 MIN: CPT

## 2023-12-05 PROCEDURE — 97110 THERAPEUTIC EXERCISES: CPT

## 2023-12-05 NOTE — PROGRESS NOTES
OT Evaluation     Today's date: 2023  Patient name: Cedric Acosta  : 1960  MRN: [id number]  Referring provider: Mica Lord PA-C  Dx:   Encounter Diagnosis     ICD-10-CM    1. Closed torus fracture of distal end of left radius with routine healing, subsequent encounter  S52.522D       2. Aftercare following surgery  Z48.89                      Assessment  Assessment details: Cedric Acosta is a 58 y.o., Right HD female referred to hand therapy for a left wrist fracture. Onset of injury 11/3/23 due to fall while walking dog. ORIF on 23. Patient presents 23 with impaired ROM, strength of the left hand, wrist, forearm. Deficits also noted in pain, edema and functional use of the left UE. Patient has an incisions on the left distal volar forearm and left palm that are healing well with no signs or symptoms of infection. Patient is a good candidate for OT services to abolish pain and edema and restore ROM, strength, coordination for a return to independence in daily tasks. Impairments: abnormal coordination, abnormal or restricted ROM, activity intolerance, impaired physical strength, lacks appropriate home exercise program, pain with function and weight-bearing intolerance  Other impairment: Edema; old mallet deformity LRF  Functional limitations: Impaired self care and IADLs.  Using right hand for most daily tasks with min assist from LUE  Symptom irritability: moderateBarriers to therapy: Hx right breast cancer; fusions lumbar and cervical spine; neuropathy in feet  Understanding of Dx/Px/POC: excellent   Prognosis: good    Goals  STGs (4 weeks)  Patient will be independent in HEP of ROM, scar and edema management  Patient will report an average pain level of  2-3/10  Patient will demonstrate 10-20 degree improvement in SCHOFIELD of the digits  Patient will demonstrate active wrist extension/flexion to 60/40  Patient will demonstrate active forearm supination/pronation to 70/40  Patient will demonstrate full wound healing  LTGs (12 weeks)  Patient will demonstrate independence in a HEP to maintain ROM, strength, and function at discharge  Patient will report an average pain level of 1-2/10 to be independent in daily tasks  Patient will demonstrate AROM of the digits to [facility] to be independent in self care tasks  Patient will demonstrate AROM of the wrist and forearm WFL to be independent in self care tasks  Patient will demonstrate 5/5 muscle strength in the wrist and forearm to be MI for meal prep  Patient will demonstrate left hand strength within 30% of the right hand to be MI for IADL tasks  Patient will demonstrate resolution of edema      Plan  Plan details: 12/5/23: Begin OT 2x/wk x 12 weeks  Patient would benefit from: skilled occupational therapy and custom splinting  Planned modality interventions: ultrasound, thermotherapy: hydrocollator packs, cryotherapy and thermotherapy: paraffin bath  Planned therapy interventions: activity modification, compression, fine motor coordination training, graded activity, graded exercise, home exercise program, therapeutic exercise, therapeutic activities, stretching, strengthening, patient education, orthotic fitting/training, neuromuscular re-education, manual therapy, IASTM, joint mobilization, kinesiology taping, massage and muscle pump exercises  Frequency: 2x week  Duration in weeks: 12  Plan of Care beginning date: 12/5/2023  Plan of Care expiration date: 3/1/2024  Treatment plan discussed with: patient        Subjective Evaluation    History of Present Illness  Date of onset: 11/3/2023  Date of surgery: 11/14/2023  Mechanism of injury: surgery and trauma  Mechanism of injury: This is a 58 y.o. female who states on 11/3/23 she was hiking in the woods when she fell, injuring her left wrist. She presented to Urgent Care after the injury, at which time x-rays were performed and she was placed into a splint.  She then was evaluated by  Meagan at which time repeat x-rays were obtained. She states she also had a hematoma to her right ring finger metacarpal base area. 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 She states she has a history of  known bilateral carpal tunnel syndrome. Patient had an ORIF of her left distal radius fracture with CT release on 23. The post op dressing was removed 23 and patient placed in a custom wrist orthosis. She now presents for OT evaluation and treatment. Quality of life: fair    Patient Goals  Patient goals for therapy: decreased edema, decreased pain, increased motion, increased strength and independence with ADLs/IADLs  Patient goal: Washing dishes, walking dog  Pain  Current pain ratin  At best pain rating: 3  At worst pain ratin  Location: Left wrist  Quality: tight  Relieving factors: rest and medications (tylenol, ibuprofen prn)  Exacerbated by: movement. 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      Additional Observation Details  23: Dried skin and scabs on CT scar and forearm scar    Tenderness     Additional Tenderness Details  23: Tender over fracture sites    Neurological Testing     Sensation     Wrist/Hand   Left   Intact: light touch    Comments   Left light touch: Grossly intact on semmes Burt Monofilaments     Active Range of Motion   Left Shoulder   Flexion: 150 degrees WFL  Abduction: WFL  External rotation BTH: WFL  Internal rotation BTB: WFL    Left Elbow   Flexion: WFL  Extension: WFL  Forearm supination: 60 degrees   Forearm pronation: 25 degrees     Left Wrist   Wrist flexion: 18 degrees with pain  Wrist extension: 50 degrees with pain  Radial deviation: 5 degrees with pain  Ulnar deviation: 20 degrees with pain      Left Thumb   Flexion     CMC: 20 degrees    MP: 55 degrees    DIP: 45 degrees  Palmar Abduction     CMC: 45 degrees  Radial abduction    CMC: 40 degrees  Kapandji score: 8 degrees    Opposition: 23: SCHOFIELD = 120    Left Digits    Flexion   Index     MCP: 80    PIP: 95    DIP: 60  Middle     MCP: 80    PIP: 100    DIP: 70  Ring     MCP: 75    PIP: 110    DIP: 55  Little     MCP: 80    PIP: 95    DIP: 70  Extension   Ring     DIP: -25  Index: 235 degrees  Middle: 250 degrees  Rin degrees  Small: 245 degrees    Swelling     Left Wrist/Hand   Circumference wrist: 17.7 cm    Right Wrist/Hand   Circumference wrist: 16.3 cm             Precautions: ORIF 23. Cervical and lumbar fusions; hx R breast cancer         POC expires Unit limit Auth  expiration date PT/OT + Visit Limit?    3/1/24 NA 3/1/24 60 cy                 Visit/Unit Tracking  AUTH Status:  Date               Re due 24 Used 1               Remaining                     Date 23       Visit  1       Manuals        Edema massage        Scar massage 2'       compression Size C tg               Ortho fit/train 10' splint adjustment for comfort       Neuro Re-Ed                                                                 Ther Ex        A/AAROM digits AROM x 10       A/AAROM wrist all planes AROM x 10 all planes       A/AAROM FA AROM x 10                                               Ther Activity        Wrist maze x5       Towel crunches        Opposition        Translation        Cones p/s        HEP AROM digits, wrist, FA;        POC               Modalities        MHP 5'

## 2023-12-08 ENCOUNTER — OFFICE VISIT (OUTPATIENT)
Dept: OCCUPATIONAL THERAPY | Facility: CLINIC | Age: 63
End: 2023-12-08
Payer: COMMERCIAL

## 2023-12-08 DIAGNOSIS — Z48.89 AFTERCARE FOLLOWING SURGERY: ICD-10-CM

## 2023-12-08 DIAGNOSIS — S52.522D CLOSED TORUS FRACTURE OF DISTAL END OF LEFT RADIUS WITH ROUTINE HEALING, SUBSEQUENT ENCOUNTER: Primary | ICD-10-CM

## 2023-12-08 PROCEDURE — 97110 THERAPEUTIC EXERCISES: CPT

## 2023-12-08 PROCEDURE — 97140 MANUAL THERAPY 1/> REGIONS: CPT

## 2023-12-08 NOTE — PROGRESS NOTES
Daily Note     Today's date: 2023  Patient name: Jadyn De Jesus  : 1960  MRN: [id number]  Referring provider: Tru Giles PA-C  Dx:   Encounter Diagnosis     ICD-10-CM    1. Closed torus fracture of distal end of left radius with routine healing, subsequent encounter  S52.522D       2. Aftercare following surgery  Z48.89                      Subjective: I'm doing good. I was able to blow dry my hair today      Objective: See treatment diary below      Assessment: Tolerated treatment well. Patient exhibited good technique with therapeutic exercises. Patient instructed in scar massage for HEP. No pain reported during tx. AROM wrist is still limited      Plan: Continue per plan of care. Precautions: ORIF 23. Cervical and lumbar fusions; hx R breast cancer         POC expires Unit limit Auth  expiration date PT/OT + Visit Limit?    3/1/24 NA 3/1/24 60 cy                 Visit/Unit Tracking  AUTH Status:  Date              Re due 24 Used 1 2              Remaining                     Date 23      Visit  1 2      Manuals        Edema massage  3'      Scar massage 2' 5'      compression Size C tg               Ortho fit/train 10' splint adjustment for comfort       Neuro Re-Ed                                                                 Ther Ex        A/AAROM digits AROM x 10 x20      A/AAROM wrist all planes AROM x 10 all planes AAROM 10'      A/AAROM FA AROM x 10 AAROM 3'      Sustained grasp  Pencils 2 sets                                      Ther Activity        Wrist maze x5 x10      Towel crunches        Opposition        Translation  Colored beads 1 set      Cones p/s  2 sets      HEP AROM digits, wrist, FA; Scar massage       POC               Modalities        MHP 5' 5'

## 2023-12-11 ENCOUNTER — OFFICE VISIT (OUTPATIENT)
Dept: OCCUPATIONAL THERAPY | Facility: CLINIC | Age: 63
End: 2023-12-11
Payer: COMMERCIAL

## 2023-12-11 DIAGNOSIS — Z48.89 AFTERCARE FOLLOWING SURGERY: ICD-10-CM

## 2023-12-11 DIAGNOSIS — S52.522D CLOSED TORUS FRACTURE OF DISTAL END OF LEFT RADIUS WITH ROUTINE HEALING, SUBSEQUENT ENCOUNTER: Primary | ICD-10-CM

## 2023-12-11 PROCEDURE — 97530 THERAPEUTIC ACTIVITIES: CPT

## 2023-12-11 PROCEDURE — 97110 THERAPEUTIC EXERCISES: CPT

## 2023-12-11 PROCEDURE — 97140 MANUAL THERAPY 1/> REGIONS: CPT

## 2023-12-11 NOTE — PROGRESS NOTES
Daily Note     Today's date: 2023  Patient name: Sorin Anderson  : 1960  MRN: [id number]  Referring provider: Viktoria Lawson PA-C  Dx:   Encounter Diagnosis     ICD-10-CM    1. Closed torus fracture of distal end of left radius with routine healing, subsequent encounter  S52.522D       2. Aftercare following surgery  Z48.89                      Subjective: I had to shovel snow today. I wore my brace. I didn't have too much pain      Objective: See treatment diary below      Assessment: Tolerated treatment well. Patient exhibited good technique with therapeutic exercises. Patient is using the LUE as a min to mod assist for dishes, house work      Plan: Continue per plan of care. Precautions: ORIF 23. Cervical and lumbar fusions; hx R breast cancer         POC expires Unit limit Auth  expiration date PT/OT + Visit Limit?    3/1/24 NA 3/1/24 60 cy                 Visit/Unit Tracking  AUTH Status:  Date             Re due 24 Used 1 2 3             Remaining                     Date 23     Visit  1 2 3     Manuals        Edema massage  3' 5'     Scar massage 2' 5' 5'     compression Size C tg               Ortho fit/train 10' splint adjustment for comfort       Neuro Re-Ed                                                                 Ther Ex        A/AAROM digits AROM x 10 x20 x20     A/AAROM wrist all planes AROM x 10 all planes AAROM 10' AA 10'     A/AAROM FA AROM x 10 AAROM 3' AA5'     Sustained grasp  Pencils 2 sets Pencils 2 sets     Hook to full fist glides   Sm pen x 20                             Ther Activity        Wrist maze x5 x10 x10     Towel crunches        Opposition        Translation  Colored beads 1 set Colored beads 1 set     Cones p/s  2 sets      HEP AROM digits, wrist, FA; Scar massage       POC               Modalities        MHP 5' 5' 5'

## 2023-12-14 ENCOUNTER — OFFICE VISIT (OUTPATIENT)
Dept: OCCUPATIONAL THERAPY | Facility: CLINIC | Age: 63
End: 2023-12-14
Payer: COMMERCIAL

## 2023-12-14 DIAGNOSIS — S52.522D CLOSED TORUS FRACTURE OF DISTAL END OF LEFT RADIUS WITH ROUTINE HEALING, SUBSEQUENT ENCOUNTER: Primary | ICD-10-CM

## 2023-12-14 DIAGNOSIS — Z48.89 AFTERCARE FOLLOWING SURGERY: ICD-10-CM

## 2023-12-14 PROCEDURE — 97140 MANUAL THERAPY 1/> REGIONS: CPT

## 2023-12-14 PROCEDURE — 97110 THERAPEUTIC EXERCISES: CPT

## 2023-12-14 PROCEDURE — 97530 THERAPEUTIC ACTIVITIES: CPT

## 2023-12-14 NOTE — PROGRESS NOTES
Daily Note     Today's date: 2023  Patient name: Isael Martin  : 1960  MRN: [id number]  Referring provider: Justin Alvarado PA-C  Dx:   Encounter Diagnosis     ICD-10-CM    1. Closed torus fracture of distal end of left radius with routine healing, subsequent encounter  S52.522D       2. Aftercare following surgery  Z48.89           Start Time: 1030  Stop Time: 1120  Total time in clinic (min): 50 minutes    Subjective: "I can do more things with my hand functionally."      Objective: See treatment diary below      Assessment: Tolerated treatment well. Patient exhibited good technique with therapeutic exercises and would benefit from continued OT. Mild redness and tenderness at scar areas with no signs of infection. Mild tightness noted during passive wrist extension. Plan: Continue per plan of care. Precautions: ORIF 23. Cervical and lumbar fusions; hx R breast cancer         POC expires Unit limit Auth  expiration date PT/OT + Visit Limit?    3/1/24 NA 3/1/24 60 cy                 Visit/Unit Tracking  AUTH Status:  Date            Re due 24 Used 1 2 3 4            Remaining                     Date 23    Visit  1 2 3 4    Manuals        Edema massage  3' 5'     Scar massage 2' 5' 5' 8'    compression Size C tg               Ortho fit/train 10' splint adjustment for comfort       Neuro Re-Ed                                                                 Ther Ex        A/AAROM digits AROM x 10 x20 x20     A/AAROM wrist all planes AROM x 10 all planes AAROM 10' AA 10' PROM 10'    A/AAROM FA AROM x 10 AAROM 3' AA5' PROM 5'    Sustained grasp  Pencils 2 sets Pencils 2 sets Pencils 3 sets    Hook to full fist glides   Sm pen x 20 x30                            Ther Activity        Wrist maze x5 x10 x10 x10    Towel crunches    x10 base of SF    Opposition        Translation  Colored beads 1 set Colored beads 1 set Colored beads 1 set    Cones p/s  2 sets      HEP AROM digits, wrist, FA; Scar massage       POC               Modalities        [facility] 5' 5' 5' 7'

## 2023-12-20 ENCOUNTER — OFFICE VISIT (OUTPATIENT)
Dept: OCCUPATIONAL THERAPY | Facility: CLINIC | Age: 63
End: 2023-12-20
Payer: COMMERCIAL

## 2023-12-20 DIAGNOSIS — Z48.89 AFTERCARE FOLLOWING SURGERY: ICD-10-CM

## 2023-12-20 DIAGNOSIS — S52.522D CLOSED TORUS FRACTURE OF DISTAL END OF LEFT RADIUS WITH ROUTINE HEALING, SUBSEQUENT ENCOUNTER: Primary | ICD-10-CM

## 2023-12-20 PROCEDURE — 97530 THERAPEUTIC ACTIVITIES: CPT

## 2023-12-20 PROCEDURE — 97140 MANUAL THERAPY 1/> REGIONS: CPT

## 2023-12-20 PROCEDURE — 97110 THERAPEUTIC EXERCISES: CPT

## 2023-12-20 NOTE — PROGRESS NOTES
"Daily Note     Today's date: 2023  Patient name: Doreen Barroso  : 1960  MRN: [id number]  Referring provider: Chay Cool PA-C  Dx:   Encounter Diagnosis     ICD-10-CM    1. Closed torus fracture of distal end of left radius with routine healing, subsequent encounter  S52.522D       2. Aftercare following surgery  Z48.89           Start Time: 0900  Stop Time: 0950  Total time in clinic (min): 50 minutes    Subjective: \"I haven't worn my brace while sleeping recently and it's sore today.\"  Patient reports mild pain in first webspace of R hand      Objective: See treatment diary below      Assessment: Tolerated treatment well. Patient exhibited good technique with therapeutic exercises and would benefit from continued OT. Patient has mild pain in 1st webspace of left hand. Added webspace stretch and massage to HEP to help soften tissue in this area. Pain over ulnar styloid when pronating during therapeutic exercise may be due to mildly displaced ulnar styloid as per xray on 23. Discussed the importance of wearing orthosis during nighttime to protect wrist while sleeping.      Plan: Continue per plan of care.      Precautions: ORIF 23. Cervical and lumbar fusions; hx R breast cancer         POC expires Unit limit Auth  expiration date PT/OT + Visit Limit?   3/1/24 NA 3/1/24 60 cy                 Visit/Unit Tracking  AUTH Status:  Date 12/5 12/8 12/11 12/14 12/19          Re due 24 Used 1 2 3 4 5 FOTO           Remaining                     Date 23   Visit  1 2 3 4 5   Manuals        Edema massage  3' 5'     Scar massage 2' 5' 5' 8' 8'   compression Size C tg               Ortho fit/train 10' splint adjustment for comfort       Neuro Re-Ed                                                                 Ther Ex        A/AAROM digits AROM x 10 x20 x20     A/AAROM wrist all planes AROM x 10 all planes AAROM 10' AA 10' PROM 10' PROM e/f 10'  AROM " e/f x20 ea   A/AAROM FA AROM x 10 AAROM 3' AA5' PROM 5'    Sustained grasp  Pencils 2 sets Pencils 2 sets Pencils 3 sets    Hook to full fist glides   Sm pen x 20 x30    1st webspace stretch     2'                   Ther Activity        Wrist maze x5 x10 x10 x10 x10   Towel crunches    x10 base of SF    Opposition     Colored beads 1/2 set oppose to each finger   Translation  Colored beads 1 set Colored beads 1 set Colored beads 1 set Colored beads 1/2 set   Cones p/s  2 sets   1 set   HEP AROM digits, wrist, FA; Scar massage       POC               Modalities        MHP 5' 5' 5' 7' 5'

## 2023-12-21 ENCOUNTER — TELEPHONE (OUTPATIENT)
Dept: ADMINISTRATIVE | Facility: OTHER | Age: 63
End: 2023-12-21

## 2023-12-21 ENCOUNTER — OFFICE VISIT (OUTPATIENT)
Dept: FAMILY MEDICINE CLINIC | Facility: CLINIC | Age: 63
End: 2023-12-21
Payer: COMMERCIAL

## 2023-12-21 VITALS
TEMPERATURE: 98.4 F | OXYGEN SATURATION: 97 % | SYSTOLIC BLOOD PRESSURE: 132 MMHG | DIASTOLIC BLOOD PRESSURE: 80 MMHG | BODY MASS INDEX: 23.39 KG/M2 | HEIGHT: 67 IN | WEIGHT: 149 LBS | HEART RATE: 113 BPM

## 2023-12-21 DIAGNOSIS — Z23 ENCOUNTER FOR VACCINATION: ICD-10-CM

## 2023-12-21 DIAGNOSIS — F51.01 PRIMARY INSOMNIA: ICD-10-CM

## 2023-12-21 DIAGNOSIS — Z12.31 ENCOUNTER FOR SCREENING MAMMOGRAM FOR MALIGNANT NEOPLASM OF BREAST: ICD-10-CM

## 2023-12-21 DIAGNOSIS — M62.838 MUSCLE SPASMS OF NECK: ICD-10-CM

## 2023-12-21 DIAGNOSIS — Z12.4 SCREENING FOR CERVICAL CANCER: ICD-10-CM

## 2023-12-21 DIAGNOSIS — Z23 ENCOUNTER FOR IMMUNIZATION: ICD-10-CM

## 2023-12-21 DIAGNOSIS — Z13.0 SCREENING FOR DEFICIENCY ANEMIA: ICD-10-CM

## 2023-12-21 DIAGNOSIS — M62.830 SPASM OF MUSCLE OF LOWER BACK: ICD-10-CM

## 2023-12-21 DIAGNOSIS — R00.0 TACHYCARDIA, UNSPECIFIED: ICD-10-CM

## 2023-12-21 DIAGNOSIS — Z76.89 ENCOUNTER TO ESTABLISH CARE: Primary | ICD-10-CM

## 2023-12-21 DIAGNOSIS — E03.9 ACQUIRED HYPOTHYROIDISM: ICD-10-CM

## 2023-12-21 DIAGNOSIS — Z13.1 SCREENING FOR DIABETES MELLITUS: ICD-10-CM

## 2023-12-21 DIAGNOSIS — F33.1 MODERATE EPISODE OF RECURRENT MAJOR DEPRESSIVE DISORDER (HCC): ICD-10-CM

## 2023-12-21 DIAGNOSIS — Z13.220 SCREENING FOR LIPID DISORDERS: ICD-10-CM

## 2023-12-21 DIAGNOSIS — Z78.0 ASYMPTOMATIC MENOPAUSAL STATE: ICD-10-CM

## 2023-12-21 DIAGNOSIS — Z12.31 ENCOUNTER FOR SCREENING MAMMOGRAM FOR BREAST CANCER: ICD-10-CM

## 2023-12-21 DIAGNOSIS — Z11.4 SCREENING FOR HIV (HUMAN IMMUNODEFICIENCY VIRUS): ICD-10-CM

## 2023-12-21 DIAGNOSIS — I10 PRIMARY HYPERTENSION: ICD-10-CM

## 2023-12-21 DIAGNOSIS — J30.2 SEASONAL ALLERGIES: ICD-10-CM

## 2023-12-21 DIAGNOSIS — Z11.59 NEED FOR HEPATITIS C SCREENING TEST: ICD-10-CM

## 2023-12-21 DIAGNOSIS — M54.12 CERVICAL MYELOPATHY WITH CERVICAL RADICULOPATHY: ICD-10-CM

## 2023-12-21 DIAGNOSIS — K21.9 GASTROESOPHAGEAL REFLUX DISEASE WITHOUT ESOPHAGITIS: ICD-10-CM

## 2023-12-21 DIAGNOSIS — G95.9 CERVICAL MYELOPATHY WITH CERVICAL RADICULOPATHY: ICD-10-CM

## 2023-12-21 DIAGNOSIS — Z13.29 SCREENING FOR THYROID DISORDER: ICD-10-CM

## 2023-12-21 PROBLEM — G62.9 SMALL FIBER NEUROPATHY: Status: ACTIVE | Noted: 2017-01-01

## 2023-12-21 PROBLEM — G47.00 INSOMNIA: Status: ACTIVE | Noted: 2023-02-21

## 2023-12-21 PROBLEM — R90.82 WHITE MATTER ABNORMALITY ON MRI OF BRAIN: Status: ACTIVE | Noted: 2018-03-14

## 2023-12-21 PROBLEM — M41.9 SCOLIOSIS: Status: ACTIVE | Noted: 2023-12-21

## 2023-12-21 PROCEDURE — 90677 PCV20 VACCINE IM: CPT

## 2023-12-21 PROCEDURE — 90471 IMMUNIZATION ADMIN: CPT

## 2023-12-21 PROCEDURE — 99204 OFFICE O/P NEW MOD 45 MIN: CPT | Performed by: NURSE PRACTITIONER

## 2023-12-21 RX ORDER — AMLODIPINE BESYLATE 10 MG/1
10 TABLET ORAL DAILY
Qty: 90 TABLET | Refills: 1 | Status: SHIPPED | OUTPATIENT
Start: 2023-12-21

## 2023-12-21 RX ORDER — LEVOTHYROXINE SODIUM 112 UG/1
112 TABLET ORAL DAILY
Qty: 90 TABLET | Refills: 1 | Status: SHIPPED | OUTPATIENT
Start: 2023-12-21

## 2023-12-21 RX ORDER — ZOLPIDEM TARTRATE 12.5 MG/1
12.5 TABLET, FILM COATED, EXTENDED RELEASE ORAL
Qty: 30 TABLET | Refills: 0 | Status: SHIPPED | OUTPATIENT
Start: 2023-12-21

## 2023-12-21 RX ORDER — ESOMEPRAZOLE MAGNESIUM 40 MG/1
40 CAPSULE, DELAYED RELEASE ORAL
Status: CANCELLED | OUTPATIENT
Start: 2023-12-21

## 2023-12-21 RX ORDER — LOSARTAN POTASSIUM 100 MG/1
100 TABLET ORAL DAILY
Qty: 90 TABLET | Refills: 1 | Status: SHIPPED | OUTPATIENT
Start: 2023-12-21

## 2023-12-21 RX ORDER — CYCLOBENZAPRINE HCL 5 MG
10 TABLET ORAL
Qty: 90 TABLET | Refills: 1 | Status: SHIPPED | OUTPATIENT
Start: 2023-12-21

## 2023-12-21 RX ORDER — AZELASTINE 1 MG/ML
1 SPRAY, METERED NASAL 2 TIMES DAILY
Qty: 30 ML | Refills: 2 | Status: SHIPPED | OUTPATIENT
Start: 2023-12-21

## 2023-12-21 NOTE — TELEPHONE ENCOUNTER
----- Message from Cecille Deshpande sent at 12/21/2023  9:39 AM EST -----  12/21/23 9:43 AM    Hello, our patient Doreen Barroso has had CRC: Colonoscopy completed/performed. Please assist in updating the patient chart by making an External outreach to Dr. Caban [facility] located in [address] [place] [address] [phone]. The date of service is about 5 years ago.    Thank you,  Cecille Deshpande  [facility]

## 2023-12-21 NOTE — TELEPHONE ENCOUNTER
Upon review of the In Basket request and the patient's chart, initial outreach has been made via fax to facility. Please see Contacts section for details.     Thank you  Luisana Saxena MA

## 2023-12-21 NOTE — PROGRESS NOTES
Assessment/Plan:    Problem List Items Addressed This Visit     Moderate episode of recurrent major depressive disorder (HCC)    Relevant Medications    zolpidem (AMBIEN CR) 12.5 MG CR tablet    GERD (gastroesophageal reflux disease)    Hypertension    Relevant Medications    losartan (COZAAR) 100 MG tablet    amLODIPine (NORVASC) 10 mg tablet    Hypothyroidism    Relevant Medications    levothyroxine 112 mcg tablet    Insomnia    Relevant Medications    zolpidem (AMBIEN CR) 12.5 MG CR tablet    Seasonal allergies    Relevant Medications    azelastine (ASTELIN) 0.1 % nasal spray    Tachycardia, unspecified    Cervical myelopathy with cervical radiculopathy    Other Visit Diagnoses     Encounter to establish care    -  Primary    Need for hepatitis C screening test        Relevant Orders    Hepatitis C Antibody    Encounter for immunization        Screening for HIV (human immunodeficiency virus)        Relevant Orders    HIV 1/2 AG/AB w Reflex [facility] for 2 yr old and above    Screening for cervical cancer        Encounter for screening mammogram for breast cancer        Relevant Orders    Mammo screening bilateral w 3d & cad    Screening for thyroid disorder        Relevant Orders    TSH, 3rd generation with Free T4 reflex    Screening for deficiency anemia        Relevant Orders    CBC and differential    Screening for lipid disorders        Relevant Orders    Lipid Panel with Direct LDL reflex    Screening for diabetes mellitus        Relevant Orders    Comprehensive metabolic panel    Muscle spasms of neck        Relevant Medications    cyclobenzaprine (FLEXERIL) 5 mg tablet    Spasm of muscle of lower back        Relevant Medications    cyclobenzaprine (FLEXERIL) 5 mg tablet    Encounter for vaccination        Relevant Orders    Pneumococcal Conjugate Vaccine 20-valent (PCV20) (Completed)    Asymptomatic menopausal state        Relevant Orders    DXA bone density spine hip and pelvis    Encounter for screening  mammogram for malignant neoplasm of breast        Relevant Orders    Mammo screening bilateral w 3d & cad           Diagnoses and all orders for this visit:    Encounter to establish care    Need for hepatitis C screening test  -     Hepatitis C Antibody; Future    Encounter for immunization    Screening for HIV (human immunodeficiency virus)  -     HIV 1/2 AG/AB w Reflex [facility] for 2 yr old and above; Future    Screening for cervical cancer    Encounter for screening mammogram for breast cancer  -     Mammo screening bilateral w 3d & cad; Future    Gastroesophageal reflux disease without esophagitis    Acquired hypothyroidism  -     levothyroxine 112 mcg tablet; Take 1 tablet (112 mcg total) by mouth daily    Primary hypertension  -     losartan (COZAAR) 100 MG tablet; Take 1 tablet (100 mg total) by mouth daily  -     amLODIPine (NORVASC) 10 mg tablet; Take 1 tablet (10 mg total) by mouth daily    Moderate episode of recurrent major depressive disorder (HCC)    Tachycardia, unspecified    Primary insomnia  -     zolpidem (AMBIEN CR) 12.5 MG CR tablet; Take 1 tablet (12.5 mg total) by mouth daily at bedtime as needed for sleep    Screening for thyroid disorder  -     TSH, 3rd generation with Free T4 reflex; Future    Screening for deficiency anemia  -     CBC and differential; Future    Screening for lipid disorders  -     Lipid Panel with Direct LDL reflex; Future    Screening for diabetes mellitus  -     Comprehensive metabolic panel; Future    Muscle spasms of neck  -     cyclobenzaprine (FLEXERIL) 5 mg tablet; Take 2 tablets (10 mg total) by mouth daily at bedtime    Spasm of muscle of lower back  -     cyclobenzaprine (FLEXERIL) 5 mg tablet; Take 2 tablets (10 mg total) by mouth daily at bedtime    Seasonal allergies  -     azelastine (ASTELIN) 0.1 % nasal spray; 1 spray into each nostril 2 (two) times a day    Encounter for vaccination  -     Pneumococcal Conjugate Vaccine 20-valent (PCV20)    Cervical  myelopathy with cervical radiculopathy     Asymptomatic menopausal state  -     DXA bone density spine hip and pelvis; Future    Encounter for screening mammogram for malignant neoplasm of breast  -     Mammo screening bilateral w 3d & cad; Future        No problem-specific Assessment & Plan notes found for this encounter.        Subjective:      Patient ID: Doreen Barroso is a 63 y.o. female.    NP with PMHx breast cancer, hypothyroidism, hypertension, osteoporosis, scoliosis, unspecified tachycardia, small fiber neuropathy, GERD, cervical and lumbar Sx, insomnia, and MDD presents to establish care.   Pt is UTD with HM; CRC, Mammo, Dexa. Will need to caregap mammo and dexa; Pt states studies were WNL.     Recent ORIF Left Wrist Fx, managed by STEFFANIE Linares.     Hypertension  This is a chronic problem. The problem is controlled. There are no associated agents to hypertension. Risk factors for coronary artery disease include post-menopausal state. Past treatments include calcium channel blockers and angiotensin blockers. The current treatment provides significant improvement. There are no compliance problems.  There is no history of angina, kidney disease or CAD/MI. Identifiable causes of hypertension include a thyroid problem. There is no history of chronic renal disease.   Heartburn  This is a chronic problem. The symptoms are aggravated by certain foods. Pertinent negatives include no anemia, fatigue, melena, muscle weakness, orthopnea or weight loss. There are no known risk factors. She has tried a PPI for the symptoms. The treatment provided significant relief.   Thyroid Problem  Presents for follow-up visit. Patient reports no fatigue or weight loss. The symptoms have been stable.     The following portions of the patient's history were reviewed and updated as appropriate:   She has a past medical history of ADHD, Arthritis, Cancer (HCC) (2001), Disease of thyroid gland, GERD (gastroesophageal reflux disease),  Hypertension, PONV (postoperative nausea and vomiting), and Sleep apnea.,  does not have any pertinent problems on file.,   has a past surgical history that includes Cervical fusion (2023); Colonoscopy; Lumbar fusion; pr optx dstl radl i-artic fx/epiphysl sep 3 frag (Left, 11/14/2023); pr neuroplasty &/transpos median nrv carpal tunne (Left, 11/14/2023); pr application short arm splint forearm-hand static (Left, 11/14/2023); and Knee surgery (Left).,  family history includes Breast cancer in her mother; Heart disease in her mother; Hypertension in her mother; Kidney cancer in her father; Myasthenia gravis in her father.,   reports that she has been smoking cigars. She has never used smokeless tobacco. She reports current alcohol use. She reports current drug use. Frequency: 7.00 times per week.,  is allergic to fentanyl, codeine, belladonna, chlorhexidine, other, and penicillins..  Current Outpatient Medications   Medication Sig Dispense Refill   • amLODIPine (NORVASC) 10 mg tablet Take 1 tablet (10 mg total) by mouth daily 90 tablet 1   • azelastine (ASTELIN) 0.1 % nasal spray 1 spray into each nostril 2 (two) times a day 30 mL 2   • cetirizine (ZyrTEC) 10 mg tablet Take 10 mg by mouth daily     • cyanocobalamin (VITAMIN B-12) 2500 MCG TABS Take 2,500 mcg by mouth daily     • cyclobenzaprine (FLEXERIL) 5 mg tablet Take 2 tablets (10 mg total) by mouth daily at bedtime 90 tablet 1   • esomeprazole (NexIUM) 40 MG capsule Take 40 mg by mouth daily after breakfast     • levothyroxine 112 mcg tablet Take 1 tablet (112 mcg total) by mouth daily 90 tablet 1   • losartan (COZAAR) 100 MG tablet Take 1 tablet (100 mg total) by mouth daily 90 tablet 1   • zolpidem (AMBIEN CR) 12.5 MG CR tablet Take 1 tablet (12.5 mg total) by mouth daily at bedtime as needed for sleep 30 tablet 0   • Acetaminophen (TYLENOL 8 HOUR PO) Take by mouth if needed (Patient not taking: Reported on 12/21/2023)     • acetaminophen (TYLENOL) 650 mg CR  "tablet Take 1 tablet (650 mg total) by mouth every 8 (eight) hours as needed for mild pain (Patient not taking: Reported on 12/21/2023) 30 tablet 0     No current facility-administered medications for this visit.       Depression Screening and Follow-up Plan: Patient's depression screening was positive with a PHQ-2 score of 6. Their PHQ-9 score was 20.           Review of Systems   Constitutional:  Negative for fatigue and weight loss.   Gastrointestinal:  Negative for melena.   Musculoskeletal:  Negative for muscle weakness.   All other systems reviewed and are negative.        Objective:  Vitals:    12/21/23 0913   BP: 132/80   Pulse: (!) 113   Temp: 98.4 °F (36.9 °C)   TempSrc: Tympanic   SpO2: 97%   Weight: 67.6 kg (149 lb)   Height: 5' 7\" (1.702 m)     Body mass index is 23.34 kg/m².     Physical Exam  Vitals and nursing note reviewed.   Constitutional:       Appearance: Normal appearance. She is well-developed.   HENT:      Head: Normocephalic and atraumatic.      Right Ear: Tympanic membrane, ear canal and external ear normal.      Left Ear: Tympanic membrane, ear canal and external ear normal.      Nose: Nose normal.      Mouth/Throat:      Mouth: Mucous membranes are moist.      Pharynx: Uvula midline.   Eyes:      General: Lids are normal.      Conjunctiva/sclera: Conjunctivae normal.      Pupils: Pupils are equal, round, and reactive to light.   Neck:      Thyroid: No thyroid mass or thyromegaly.      Vascular: No JVD.      Trachea: Trachea and phonation normal.   Cardiovascular:      Rate and Rhythm: Normal rate and regular rhythm.      Pulses: Normal pulses.      Heart sounds: Normal heart sounds, S1 normal and S2 normal. No murmur heard.     No friction rub. No gallop.   Pulmonary:      Effort: Pulmonary effort is normal.      Breath sounds: Normal breath sounds.   Abdominal:      General: Bowel sounds are normal.      Palpations: Abdomen is soft.      Tenderness: There is no abdominal tenderness. " "  Genitourinary:     Comments: Deferred   Musculoskeletal:         General: Normal range of motion.      Cervical back: Full passive range of motion without pain, normal range of motion and neck supple.      Right lower leg: No edema.      Left lower leg: No edema.   Lymphadenopathy:      Head:      Right side of head: No submental, submandibular, tonsillar, preauricular, posterior auricular or occipital adenopathy.      Left side of head: No submental, submandibular, tonsillar, preauricular, posterior auricular or occipital adenopathy.      Cervical: No cervical adenopathy.   Skin:     General: Skin is warm and dry.      Capillary Refill: Capillary refill takes less than 2 seconds.   Neurological:      General: No focal deficit present.      Mental Status: She is alert and oriented to person, place, and time.      Cranial Nerves: No cranial nerve deficit.      Sensory: Sensation is intact.      Motor: Motor function is intact.      Coordination: Coordination is intact.      Gait: Gait is intact.      Deep Tendon Reflexes: Reflexes are normal and symmetric.   Psychiatric:         Attention and Perception: Attention and perception normal.         Mood and Affect: Mood and affect normal.         Speech: Speech normal.         Behavior: Behavior normal. Behavior is cooperative.         Thought Content: Thought content normal.         Cognition and Memory: Cognition normal.         Judgment: Judgment normal.           Portions of the record may have been created with voice recognition software. Occasional wrong word or \"sound a like\" substitutions may have occurred due to the inherent limitations of voice recognition software. Read the chart carefully and recognize, using context, where substitutions have occurred. Contact me with any questions.  "

## 2023-12-21 NOTE — LETTER
Procedure Request Form: Colonoscopy      Date Requested: 23  Patient: Doreen Barroso  Patient : 1960   Referring Provider: GOVIND Gallegos        Date of Procedure ______________________________       The above patient has informed us that they have completed their   most recent Colonoscopy at your facility. Please complete   this form and attach all corresponding procedure reports/results.    Comments __________________________________________________________  ____________________________________________________________________  ____________________________________________________________________  ____________________________________________________________________    Facility Completing Procedure _________________________________________    Form Completed By (print name) _______________________________________      Signature __________________________________________________________      These reports are needed for  compliance.    Please fax this completed form and a copy of the procedure report to our office located at [address] [place] [address] as soon as possible to Fax [phone] attention Luisana: Phone [phone]    We thank you for your assistance in treating our mutual patient.

## 2023-12-22 ENCOUNTER — OFFICE VISIT (OUTPATIENT)
Dept: OCCUPATIONAL THERAPY | Facility: CLINIC | Age: 63
End: 2023-12-22
Payer: COMMERCIAL

## 2023-12-22 DIAGNOSIS — Z48.89 AFTERCARE FOLLOWING SURGERY: ICD-10-CM

## 2023-12-22 DIAGNOSIS — S52.522D CLOSED TORUS FRACTURE OF DISTAL END OF LEFT RADIUS WITH ROUTINE HEALING, SUBSEQUENT ENCOUNTER: Primary | ICD-10-CM

## 2023-12-22 PROCEDURE — 97110 THERAPEUTIC EXERCISES: CPT

## 2023-12-22 PROCEDURE — 97530 THERAPEUTIC ACTIVITIES: CPT

## 2023-12-22 PROCEDURE — 97140 MANUAL THERAPY 1/> REGIONS: CPT

## 2023-12-27 ENCOUNTER — EVALUATION (OUTPATIENT)
Dept: OCCUPATIONAL THERAPY | Facility: CLINIC | Age: 63
End: 2023-12-27
Payer: COMMERCIAL

## 2023-12-27 DIAGNOSIS — Z48.89 AFTERCARE FOLLOWING SURGERY: ICD-10-CM

## 2023-12-27 DIAGNOSIS — S52.522D CLOSED TORUS FRACTURE OF DISTAL END OF LEFT RADIUS WITH ROUTINE HEALING, SUBSEQUENT ENCOUNTER: Primary | ICD-10-CM

## 2023-12-27 PROCEDURE — 97140 MANUAL THERAPY 1/> REGIONS: CPT

## 2023-12-27 PROCEDURE — 97110 THERAPEUTIC EXERCISES: CPT

## 2023-12-27 NOTE — PROGRESS NOTES
OT Re-Evaluation    Today's date: 2023  Patient name: Doreen Barroso  : 1960  MRN: [id number]  Referring provider: Chay Cool PA-C  Dx:   Encounter Diagnosis     ICD-10-CM    1. Closed torus fracture of distal end of left radius with routine healing, subsequent encounter  S52.522D       2. Aftercare following surgery  Z48.89             Start Time: 0900  Stop Time: 1000  Total time in clinic (min): 60 minutes    Assessment  Assessment details: 23: Patient has made improvements in forearm, wrist, and digit AROM. Initiated various strengthening activities and IASTM to palm just distal to CTR site today and patient tolerated well. Patient continues to present with mild swelling along ulnar wrist. Both scars are healing well with no sign of infection. Mild redness and adhesion at forearm scar. Patient would be a good candidate for continued OT services to improve AROM, improve and maintain strength, promote scar healing, and resolve edema around L wrist.    Impairments: abnormal or restricted ROM, activity intolerance, impaired physical strength and pain with function  Other impairment: Edema; old mallet deformity LRF  Functional limitations: Impaired self care and IADLs. Using right hand for most daily tasks with min assist from LUE  Symptom irritability: moderateBarriers to therapy: Hx right breast cancer; fusions lumbar and cervical spine; neuropathy in feet  Understanding of Dx/Px/POC: excellent   Prognosis: good    Goals  STGs (4 weeks)  Patient will be independent in HEP of ROM, scar and edema management MET  Patient will report an average pain level of  2-3/10 MET  Patient will demonstrate 10-20 degree improvement in SCHOFIELD of the digits MET  Patient will demonstrate active wrist extension/flexion to 60/40 MET  Patient will demonstrate active forearm supination/pronation to 70/40 MET  Patient will demonstrate full wound healing IN PROGRESS  LTGs (12 weeks)  Patient will demonstrate  independence in a HEP to maintain ROM, strength, and function at discharge NOT MET  Patient will report an average pain level of 1-2/10 to be independent in daily tasks IN PROGRESS  Patient will demonstrate AROM of the digits to WFL to be independent in self care tasks MET  Patient will demonstrate AROM of the wrist and forearm WFL to be independent in self care tasks IN PROGRESS  Patient will demonstrate 5/5 muscle strength in the wrist and forearm to be MI for meal prep IN PROGRESS  Patient will demonstrate left hand strength within 30% of the right hand to be MI for IADL tasks IN PROGRESS  Patient will demonstrate resolution of edema IN PROGRESS      Plan  Plan details: 12/27/23: Continue OT 2x/week for 6 weeks  Patient would benefit from: skilled occupational therapy and custom splinting  Planned modality interventions: ultrasound, thermotherapy: hydrocollator packs, cryotherapy and thermotherapy: paraffin bath  Planned therapy interventions: activity modification, compression, fine motor coordination training, graded activity, graded exercise, home exercise program, therapeutic exercise, therapeutic activities, stretching, strengthening, patient education, orthotic fitting/training, neuromuscular re-education, manual therapy, IASTM, joint mobilization, kinesiology taping, massage and muscle pump exercises  Frequency: 2x week  Duration in weeks: 12  Plan of Care beginning date: 12/5/2023  Plan of Care expiration date: 3/1/2024  Treatment plan discussed with: patient        Subjective Evaluation    History of Present Illness  Date of onset: 11/3/2023  Date of surgery: 11/14/2023  Mechanism of injury: surgery and trauma  Mechanism of injury: 12/5/23: This is a 63 y.o. female who states on 11/3/23 she was hiking in the woods when she fell, injuring her left wrist. She presented to Urgent Care after the injury, at which time x-rays were performed and she was placed into a splint. She then was evaluated by Dr. Rhodes  at which time repeat x-rays were obtained. She states she also had a hematoma to her right ring finger metacarpal base area. 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 She states she has a history of  known bilateral carpal tunnel syndrome. Patient had an ORIF of her left distal radius fracture with CT release on 23. The post op dressing was removed 23 and patient placed in a custom wrist orthosis. She now presents for OT evaluation and treatment.    23: Patient reports that she feels like she has increased flexibility. She has pain over her carpal tunnel release site and radiating distally. She has been completing her exercises at home regularly. Reports that she is very mindful about how much she is lifting.  Quality of life: fair    Patient Goals  Patient goals for therapy: decreased edema, decreased pain, increased motion, increased strength and independence with ADLs/IADLs  Patient goal: Washing dishes, walking dog  Pain  Current pain ratin  At best pain ratin  At worst pain ratin  Location: Left wrist, left carpal tunnel release site  Quality: sharp and radiating  Relieving factors: rest and support  Exacerbated by: movement.  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     Additional Observation Details  23: Dried skin and scabs on CT scar and forearm scar  23: Small adhesion on forearm scar    Tenderness     Additional Tenderness Details  23: Tender over fracture sites  23: Tender over fracture sites    Neurological Testing     Sensation     Wrist/Hand   Left   Intact: light  touch    Comments   Left light touch: Grossly intact on semmes Burt Monofilaments     Active Range of Motion   Left Shoulder   Flexion: 150 degrees WFL  Abduction: WFL  External rotation BTH: WFL  Internal rotation BTB: WFL    Left Elbow   Flexion: WFL  Extension: WFL  Forearm supination: 72 degrees   Forearm pronation: 65 degrees     Left Wrist   Wrist flexion: 46 degrees with pain  Wrist extension: 56 degrees with pain  Radial deviation: 10 degrees with pain  Ulnar deviation: 20 degrees with pain      Left Thumb   Flexion     CMC: 20 degrees    MP: 50 degrees    DIP: 45 degrees  Palmar Abduction     CMC: 45 degrees  Radial abduction    CMC: 40 degrees  Kapandji score: 8 degrees    Opposition: 23: SCHOFIELD = 120  23: SCHOFIELD = 110    Left Digits    Flexion   Index     MCP: 80    PIP: 95    DIP: 60  Middle     MCP: 80    PIP: 100    DIP: 70  Ring     MCP: 75    PIP: 110    DIP: 55  Little     MCP: 80    PIP: 95    DIP: 70  Extension   Ring     DIP: -25  Index: 235 degrees  Middle: 250 degrees  Rin degrees  Small: 245 degrees    Additional Active Range of Motion Details  23: 12 degree improvement in L forearm supination and 40 degree improvement in L forearm pronation  23: 28 degree improvement in wrist flexion, 6 degree improvement in wrist extension, 5 degree improvement in RD  23: Full composite flexion of L digits    Strength/Myotome Testing     Left Wrist/Hand      (2nd hand position)     Trial 1: 60    Thumb Strength  Key/Lateral Pinch     Trial 1: 11  Tip/Two-Point Pinch     Trial 1: 9  Palmar/Three-Point Pinch     Trial 1: 9    Right Wrist/Hand      (2nd hand position)     Trial 1: 60    Thumb Strength   Key/Lateral Pinch     Trial 1: 12  Tip/Two-Point Pinch     Trial 1: 9  Palmar/Three-Point Pinch     Trial 1: 10    Swelling     Left Wrist/Hand   Circumference wrist: 17.6 cm    Additional Swelling Details  23: Edema decrease .1 cm on L wrist    Right Wrist/Hand    Circumference wrist: 16.3 cm             Precautions: ORIF 11/14/23. Cervical and lumbar fusions; hx R breast cancer           POC expires Unit limit Auth  expiration date PT/OT + Visit Limit?   3/1/24 NA 3/1/24 60 cy                          Visit/Unit Tracking  AUTH Status:  Date 12/5 12/8 12/11 12/14 12/19 12/22 12/27             Re due 1/5/24 Used 1 2 3 4 5 FOTO 6  7 RE               Remaining                                  Date 12/22/23 12/27/23 12/11/23 12/14/23 12/20/23   Visit  6 7 RE 3 4 5   Manuals             Edema massage 8'  5'       Scar massage 7'  5' 8' 8'   compression             IASTM to palmar surface distal to CTR scar  8'      TG with elastogel Size C  DC         Ortho fit/train             Neuro Re-Ed                                                                                                                Ther Ex             A/AAROM digits x30  x20       A/AAROM wrist all planes    AA 10' PROM 10' PROM e/f 10'  AROM e/f x20 ea   A/AAROM FA    AA5' PROM 5'     Sustained grasp    Pencils 2 sets Pencils 3 sets     Hook to full fist glides x30   Sm pen x 20 x30     1st webspace stretch         2'     strengthening    YPW x20  YPW Lumbrical x20          Pinch strengthening    R and R CPs on PW 2 sets         Wrist strengthening  2# x20 e/f/rd      Forearm strenghtening  2# x20 p/s      Ther Activity             Wrist maze x10  x10 x10 x10   Towel crunches       x10 base of SF     Opposition Marbles 1 set oppose each finger       Colored beads 1/2 set oppose to each finger   Translation Buttons  Colored beads 1 set Colored beads 1 set Colored beads 1/2 set   Cones p/s 2 sets      1 set   HEP                                        Modalities             MHP 5' 5' 5' 7' 5'

## 2023-12-27 NOTE — TELEPHONE ENCOUNTER
As a follow-up, a second attempt has been made for outreach via fax to facility. Please see Contacts section for details.    Thank you  Luisana Saxena MA

## 2023-12-28 NOTE — TELEPHONE ENCOUNTER
Upon review of the In Basket request we were able to locate, review, and update the patient chart as requested for CRC: Colonoscopy.    Any additional questions or concerns should be emailed to the Practice Liaisons via the appropriate education email address, please do not reply via In Basket.    Thank you  Luisana Saxena MA

## 2023-12-29 ENCOUNTER — OFFICE VISIT (OUTPATIENT)
Dept: OCCUPATIONAL THERAPY | Facility: CLINIC | Age: 63
End: 2023-12-29
Payer: COMMERCIAL

## 2023-12-29 DIAGNOSIS — S52.522D CLOSED TORUS FRACTURE OF DISTAL END OF LEFT RADIUS WITH ROUTINE HEALING, SUBSEQUENT ENCOUNTER: Primary | ICD-10-CM

## 2023-12-29 DIAGNOSIS — Z48.89 AFTERCARE FOLLOWING SURGERY: ICD-10-CM

## 2023-12-29 PROCEDURE — 97110 THERAPEUTIC EXERCISES: CPT

## 2023-12-29 PROCEDURE — 97140 MANUAL THERAPY 1/> REGIONS: CPT

## 2023-12-29 NOTE — PROGRESS NOTES
Daily Note     Today's date: 2023  Patient name: Doreen Barroso  : 1960  MRN: [id number]  Referring provider: Chay Cool PA-C  Dx:   Encounter Diagnosis     ICD-10-CM    1. Closed torus fracture of distal end of left radius with routine healing, subsequent encounter  S52.522D       2. Aftercare following surgery  Z48.89                      Subjective: The stitches keep coming through the scar.      Objective: See treatment diary below      Assessment: Tolerated treatment well. Patient exhibited good technique with therapeutic exercises. Patient tolerating PROM and strengthening well. Sutures appear to be migrating from tissues through her scar tissue. Patient advised to keep scars clean with soap and water to prevent any infection.      Plan: Continue per plan of care.      Precautions: ORIF 23. Cervical and lumbar fusions; hx R breast cancer           POC expires Unit limit Auth  expiration date PT/OT + Visit Limit?   3/1/24 NA 3/1/24 59 cy                          Visit/Unit Tracking  AUTH Status:  Date         Re due 24 Used 1 2 3 4 5 FOTO 6  7 RE  8         Remaining                              Date 23   Visit  6 7 RE 8 4 5   Manuals             Edema massage 8'         Scar massage 7'  5' 8' 8'   compression             Jt mobs wrist   7'     IASTM to palmar surface distal to CTR scar  8' 3'     TG with elastogel Size C  DC         Ortho fit/train             Neuro Re-Ed                                                                                                                Ther Ex             A/AAROM digits x30         A/AAROM wrist all planes    AA 10' PROM 10' PROM e/f 10'  AROM e/f x20 ea   A/AAROM FA    AA5' PROM 5'     Sustained grasp    Pencils 2 sets Pencils 3 sets     Hook to full fist glides x30   Sm pen x 20 x30     1st webspace stretch         2'     strengthening     YPW x20  YPW Lumbrical x20  gross grasp GPW x 30; lumbrical Y PW x 30        Pinch strengthening    R and R CPs on PW 2 sets  R,G on PW 2 sets       Wrist strengthening  2# x20 e/f/rd 3# x 20 e,f     Forearm strenghtening  2# x20 p/s 3# x 20 p/s     Ther Activity             Wrist maze x10   x10 x10   Towel crunches       x10 base of SF     Opposition Marbles 1 set oppose each finger       Colored beads 1/2 set oppose to each finger   Translation Buttons   Colored beads 1 set Colored beads 1/2 set   Cones p/s 2 sets      1 set   HEP                                        Modalities             MHP 5' 5' 5' 7' 5'

## 2024-01-25 ENCOUNTER — APPOINTMENT (OUTPATIENT)
Dept: LAB | Facility: CLINIC | Age: 64
End: 2024-01-25
Payer: MEDICARE

## 2024-01-25 DIAGNOSIS — Z11.59 NEED FOR HEPATITIS C SCREENING TEST: ICD-10-CM

## 2024-01-25 DIAGNOSIS — Z13.29 SCREENING FOR THYROID DISORDER: ICD-10-CM

## 2024-01-25 DIAGNOSIS — Z11.4 SCREENING FOR HIV (HUMAN IMMUNODEFICIENCY VIRUS): ICD-10-CM

## 2024-01-25 DIAGNOSIS — Z13.0 SCREENING FOR DEFICIENCY ANEMIA: ICD-10-CM

## 2024-01-25 DIAGNOSIS — Z13.1 SCREENING FOR DIABETES MELLITUS: ICD-10-CM

## 2024-01-25 DIAGNOSIS — Z13.220 SCREENING FOR LIPID DISORDERS: ICD-10-CM

## 2024-01-25 LAB
ALBUMIN SERPL BCP-MCNC: 4.5 G/DL (ref 3.5–5)
ALP SERPL-CCNC: 56 U/L (ref 34–104)
ALT SERPL W P-5'-P-CCNC: 10 U/L (ref 7–52)
ANION GAP SERPL CALCULATED.3IONS-SCNC: 10 MMOL/L
AST SERPL W P-5'-P-CCNC: 17 U/L (ref 13–39)
BASOPHILS # BLD AUTO: 0.06 THOUSANDS/ÂΜL (ref 0–0.1)
BASOPHILS NFR BLD AUTO: 1 % (ref 0–1)
BILIRUB SERPL-MCNC: 0.69 MG/DL (ref 0.2–1)
BUN SERPL-MCNC: 9 MG/DL (ref 5–25)
CALCIUM SERPL-MCNC: 9.8 MG/DL (ref 8.4–10.2)
CHLORIDE SERPL-SCNC: 98 MMOL/L (ref 96–108)
CHOLEST SERPL-MCNC: 185 MG/DL
CO2 SERPL-SCNC: 29 MMOL/L (ref 21–32)
CREAT SERPL-MCNC: 0.75 MG/DL (ref 0.6–1.3)
EOSINOPHIL # BLD AUTO: 0.09 THOUSAND/ÂΜL (ref 0–0.61)
EOSINOPHIL NFR BLD AUTO: 1 % (ref 0–6)
ERYTHROCYTE [DISTWIDTH] IN BLOOD BY AUTOMATED COUNT: 12.4 % (ref 11.6–15.1)
GFR SERPL CREATININE-BSD FRML MDRD: 84 ML/MIN/1.73SQ M
GLUCOSE P FAST SERPL-MCNC: 91 MG/DL (ref 65–99)
HCT VFR BLD AUTO: 45.1 % (ref 34.8–46.1)
HCV AB SER QL: NORMAL
HDLC SERPL-MCNC: 78 MG/DL
HGB BLD-MCNC: 14.9 G/DL (ref 11.5–15.4)
HIV 1+2 AB+HIV1 P24 AG SERPL QL IA: NORMAL
HIV 2 AB SERPL QL IA: NORMAL
HIV1 AB SERPL QL IA: NORMAL
HIV1 P24 AG SERPL QL IA: NORMAL
IMM GRANULOCYTES # BLD AUTO: 0.03 THOUSAND/UL (ref 0–0.2)
IMM GRANULOCYTES NFR BLD AUTO: 0 % (ref 0–2)
LDLC SERPL CALC-MCNC: 88 MG/DL (ref 0–100)
LYMPHOCYTES # BLD AUTO: 2.36 THOUSANDS/ÂΜL (ref 0.6–4.47)
LYMPHOCYTES NFR BLD AUTO: 26 % (ref 14–44)
MCH RBC QN AUTO: 31.2 PG (ref 26.8–34.3)
MCHC RBC AUTO-ENTMCNC: 33 G/DL (ref 31.4–37.4)
MCV RBC AUTO: 95 FL (ref 82–98)
MONOCYTES # BLD AUTO: 0.62 THOUSAND/ÂΜL (ref 0.17–1.22)
MONOCYTES NFR BLD AUTO: 7 % (ref 4–12)
NEUTROPHILS # BLD AUTO: 6.11 THOUSANDS/ÂΜL (ref 1.85–7.62)
NEUTS SEG NFR BLD AUTO: 65 % (ref 43–75)
NRBC BLD AUTO-RTO: 0 /100 WBCS
PLATELET # BLD AUTO: 400 THOUSANDS/UL (ref 149–390)
PMV BLD AUTO: 9.9 FL (ref 8.9–12.7)
POTASSIUM SERPL-SCNC: 4.4 MMOL/L (ref 3.5–5.3)
PROT SERPL-MCNC: 7 G/DL (ref 6.4–8.4)
RBC # BLD AUTO: 4.77 MILLION/UL (ref 3.81–5.12)
SODIUM SERPL-SCNC: 137 MMOL/L (ref 135–147)
TRIGL SERPL-MCNC: 96 MG/DL
TSH SERPL DL<=0.05 MIU/L-ACNC: 2.25 UIU/ML (ref 0.45–4.5)
WBC # BLD AUTO: 9.27 THOUSAND/UL (ref 4.31–10.16)

## 2024-01-25 PROCEDURE — 85025 COMPLETE CBC W/AUTO DIFF WBC: CPT

## 2024-01-25 PROCEDURE — 80053 COMPREHEN METABOLIC PANEL: CPT

## 2024-01-25 PROCEDURE — 36415 COLL VENOUS BLD VENIPUNCTURE: CPT

## 2024-01-25 PROCEDURE — 84443 ASSAY THYROID STIM HORMONE: CPT

## 2024-01-25 PROCEDURE — 86803 HEPATITIS C AB TEST: CPT

## 2024-01-25 PROCEDURE — 80061 LIPID PANEL: CPT

## 2024-01-25 PROCEDURE — 87389 HIV-1 AG W/HIV-1&-2 AB AG IA: CPT

## 2024-01-30 ENCOUNTER — OFFICE VISIT (OUTPATIENT)
Dept: FAMILY MEDICINE CLINIC | Facility: CLINIC | Age: 64
End: 2024-01-30
Payer: MEDICARE

## 2024-01-30 VITALS
BODY MASS INDEX: 23.54 KG/M2 | SYSTOLIC BLOOD PRESSURE: 130 MMHG | DIASTOLIC BLOOD PRESSURE: 68 MMHG | TEMPERATURE: 98.7 F | HEART RATE: 89 BPM | HEIGHT: 67 IN | OXYGEN SATURATION: 90 % | WEIGHT: 150 LBS

## 2024-01-30 DIAGNOSIS — Z12.4 SCREENING FOR CERVICAL CANCER: ICD-10-CM

## 2024-01-30 DIAGNOSIS — E03.9 ACQUIRED HYPOTHYROIDISM: ICD-10-CM

## 2024-01-30 DIAGNOSIS — K21.9 GASTROESOPHAGEAL REFLUX DISEASE WITHOUT ESOPHAGITIS: ICD-10-CM

## 2024-01-30 DIAGNOSIS — I10 PRIMARY HYPERTENSION: ICD-10-CM

## 2024-01-30 DIAGNOSIS — L23.9 ALLERGIC CONTACT DERMATITIS, UNSPECIFIED TRIGGER: ICD-10-CM

## 2024-01-30 DIAGNOSIS — Z23 ENCOUNTER FOR IMMUNIZATION: ICD-10-CM

## 2024-01-30 DIAGNOSIS — L30.0 DISCOID ECZEMA: ICD-10-CM

## 2024-01-30 DIAGNOSIS — Z00.00 WELCOME TO MEDICARE PREVENTIVE VISIT: Primary | ICD-10-CM

## 2024-01-30 PROCEDURE — G0402 INITIAL PREVENTIVE EXAM: HCPCS | Performed by: NURSE PRACTITIONER

## 2024-01-30 PROCEDURE — 99214 OFFICE O/P EST MOD 30 MIN: CPT | Performed by: NURSE PRACTITIONER

## 2024-01-30 RX ORDER — CLOBETASOL PROPIONATE 0.5 MG/G
CREAM TOPICAL 2 TIMES DAILY
Qty: 30 G | Refills: 0 | Status: SHIPPED | OUTPATIENT
Start: 2024-01-30

## 2024-01-30 NOTE — PROGRESS NOTES
" Assessment and Plan:     Problem List Items Addressed This Visit     GERD (gastroesophageal reflux disease)    Hypertension    Hypothyroidism    Allergic contact dermatitis    Relevant Medications    clobetasol (TEMOVATE) 0.05 % cream   Other Visit Diagnoses     Welcome to Medicare preventive visit    -  Primary    Screening for cervical cancer        Encounter for immunization        Discoid eczema        Relevant Medications    clobetasol (TEMOVATE) 0.05 % cream           Preventive health issues were discussed with patient, and age appropriate screening tests were ordered as noted in patient's After Visit Summary.  Personalized health advice and appropriate referrals for health education or preventive services given if needed, as noted in patient's After Visit Summary.     History of Present Illness:     Patient presents for a Medicare Wellness Visit    Rash  This is a recurrent problem. Location: belt line, bra line, sock line, and thoracic. The problem is mild. The rash is characterized by dryness and itchiness. She was exposed to a new detergent/soap (found detergent from previous resident inhabitants, thought it was All \"Clear\" however after wearing many of the clothing washed in detergent rash developed.). Associated symptoms include itching. Pertinent negatives include no fatigue. Treatments tried: changed diet back to prior and re-washed all the clothing. The treatment provided mild relief.   Hypertension  This is a chronic problem. The problem is controlled. There are no associated agents to hypertension. Risk factors for coronary artery disease include dyslipidemia and post-menopausal state. Past treatments include calcium channel blockers and angiotensin blockers. The current treatment provides moderate improvement. There are no compliance problems.  There is no history of angina, kidney disease or CAD/MI. Identifiable causes of hypertension include a thyroid problem. There is no history of chronic " renal disease.   Thyroid Problem  Presents for follow-up visit. Patient reports no fatigue or weight loss. The symptoms have been stable.   Heartburn  This is a chronic problem. The symptoms are aggravated by certain foods. Pertinent negatives include no anemia, fatigue, melena, muscle weakness, orthopnea or weight loss. She has tried a PPI and a diet change for the symptoms. The treatment provided significant relief.      Patient Care Team:  GOVIND Gallegos as PCP - General (Internal Medicine)     Review of Systems:     Review of Systems   Constitutional:  Negative for fatigue and weight loss.   Gastrointestinal:  Negative for melena.   Musculoskeletal:  Negative for muscle weakness.   Skin:  Positive for itching and rash.   All other systems reviewed and are negative.       Problem List:     Patient Active Problem List   Diagnosis   • Moderate episode of recurrent major depressive disorder (HCC)   • GERD (gastroesophageal reflux disease)   • HX: breast cancer   • Hypertension   • Hypothyroidism   • Insomnia   • Osteoporosis   • Scoliosis   • Seasonal allergies   • White matter abnormality on MRI of brain   • Tachycardia, unspecified   • Small fiber neuropathy   • Cervical myelopathy with cervical radiculopathy    • Allergic contact dermatitis      Past Medical and Surgical History:     Past Medical History:   Diagnosis Date   • ADHD    • Allergic 1975    Several allergies developed throughout my life   • Arthritis    • Cancer (HCC) 2001    R breast   • Disease of thyroid gland    • GERD (gastroesophageal reflux disease)    • Hypertension    • PONV (postoperative nausea and vomiting)    • Sleep apnea      Past Surgical History:   Procedure Laterality Date   • CERVICAL FUSION  2023   • COLONOSCOPY     • KNEE SURGERY Left    • LUMBAR FUSION     • AL APPLICATION SHORT ARM SPLINT FOREARM-HAND STATIC Left 11/14/2023    Procedure: Application left short arm splint;  Surgeon: Shawn Baez MD;  Location:   MAIN OR;  Service: Orthopedics   • AR NEUROPLASTY &/TRANSPOS MEDIAN NRV CARPAL TUNNE Left 11/14/2023    Procedure: Left open carpal tunnel release;  Surgeon: Shawn Baez MD;  Location: [facility];  Service: Orthopedics   • AR OPTX DSTL RADL I-ARTIC FX/EPIPHYSL SEP 3 FRAG Left 11/14/2023    Procedure: Open reduction internal fixation left intra-articular 3+ part distal radius fracture;  Surgeon: Shawn Baez MD;  Location: [facility];  Service: Orthopedics      Family History:     Family History   Problem Relation Age of Onset   • Heart disease Mother    • Hypertension Mother    • Breast cancer Mother    • Cancer Mother         Breast cancer, HTN, fibromyalgia   • Suicide Attempts Mother    • Kidney cancer Father    • Myasthenia gravis Father    • Cancer Father         Myasthenia gravis, renal clear cell cancer   • Cancer Sister         Melanoma, familial type   • Cancer Brother         Diabetes, HTN, CAD      Social History:     Social History     Socioeconomic History   • Marital status: /Civil Union     Spouse name: None   • Number of children: None   • Years of education: None   • Highest education level: None   Occupational History   • None   Tobacco Use   • Smoking status: Every Day     Current packs/day: 0.25     Average packs/day: 0.3 packs/day for 5.0 years (1.3 ttl pk-yrs)     Types: Cigars, Cigarettes   • Smokeless tobacco: Never   • Tobacco comments:     Smoked cigarettes x22 years. Quit 2000   Vaping Use   • Vaping status: Every Day   • Substances: THC   Substance and Sexual Activity   • Alcohol use: Yes     Alcohol/week: 2.0 standard drinks of alcohol     Types: 2 Shots of liquor per week     Comment: 2 daily bourbon drinks   • Drug use: Yes     Frequency: 7.0 times per week     Types: Marijuana     Comment: Medical marijuana pt since 2018 for chronic pain and sleep d   • Sexual activity: Not Currently     Partners: Female     Birth control/protection: None   Other Topics Concern   •  None   Social History Narrative   • None     Social Determinants of Health     Financial Resource Strain: Low Risk  (1/30/2024)    Overall Financial Resource Strain (CARDIA)    • Difficulty of Paying Living Expenses: Not hard at all   Food Insecurity: Not on file   Transportation Needs: No Transportation Needs (1/30/2024)    PRAPARE - Transportation    • Lack of Transportation (Medical): No    • Lack of Transportation (Non-Medical): No   Physical Activity: Not on file   Stress: Not on file   Social Connections: Not on file   Intimate Partner Violence: Not on file   Housing Stability: Not on file      Medications and Allergies:     Current Outpatient Medications   Medication Sig Dispense Refill   • amLODIPine (NORVASC) 10 mg tablet Take 1 tablet (10 mg total) by mouth daily 90 tablet 1   • azelastine (ASTELIN) 0.1 % nasal spray 1 spray into each nostril 2 (two) times a day 30 mL 2   • cetirizine (ZyrTEC) 10 mg tablet Take 10 mg by mouth daily     • clobetasol (TEMOVATE) 0.05 % cream Apply topically 2 (two) times a day 30 g 0   • cyanocobalamin (VITAMIN B-12) 2500 MCG TABS Take 2,500 mcg by mouth daily     • cyclobenzaprine (FLEXERIL) 5 mg tablet Take 2 tablets (10 mg total) by mouth daily at bedtime 90 tablet 1   • esomeprazole (NexIUM) 40 MG capsule Take 40 mg by mouth daily after breakfast     • levothyroxine 112 mcg tablet Take 1 tablet (112 mcg total) by mouth daily 90 tablet 1   • losartan (COZAAR) 100 MG tablet Take 1 tablet (100 mg total) by mouth daily 90 tablet 1   • zolpidem (AMBIEN CR) 12.5 MG CR tablet Take 1 tablet (12.5 mg total) by mouth daily at bedtime as needed for sleep 30 tablet 0     No current facility-administered medications for this visit.     Allergies   Allergen Reactions   • Fentanyl GI Intolerance   • Codeine Itching     And stomach cramps   • Belladonna Hives   • Chlorhexidine Hives, Itching and Rash     WIPES   • Other Hives   • Penicillins Hives     hives   hives   hives    hives    hives    hives    hives   hives   hives   hives   hives   According to [facility] ß-lactam allergy algorithm, this is a possible   Type 1 sensitivity reaction.   hives      Immunizations:     Immunization History   Administered Date(s) Administered   • Pneumococcal Conjugate Vaccine 20-valent (Pcv20), Polysace 12/21/2023      Health Maintenance:         Topic Date Due   • Cervical Cancer Screening  Never done   • Breast Cancer Screening: Mammogram  Never done   • Colorectal Cancer Screening  12/04/2028   • HIV Screening  Completed   • Hepatitis C Screening  Completed         Topic Date Due   • COVID-19 Vaccine (1) Never done   • Influenza Vaccine (1) Never done      Medicare Screening Tests and Risk Assessments:     Doreen is here for her Subsequent Wellness visit.     Health Risk Assessment:   Patient rates overall health as fair. Patient feels that their physical health rating is slightly worse. Patient is dissatisfied with their life. Eyesight was rated as slightly worse. Hearing was rated as same. Patient feels that their emotional and mental health rating is slightly worse. Patients states they are sometimes angry. Patient states they are often unusually tired/fatigued. Pain experienced in the last 7 days has been a lot. Patient's pain rating has been 6/10. Patient states that she has experienced no weight loss or gain in last 6 months.     Depression Screening:   PHQ-9 Score: 9      Fall Risk Screening:   In the past year, patient has experienced: history of falling in past year    Number of falls: 2 or more  Injured during fall?: Yes    Feels unsteady when standing or walking?: Yes    Worried about falling?: Yes      Urinary Incontinence Screening:   Patient has not leaked urine accidently in the last six months.     Home Safety:  Patient does not have trouble with stairs inside or outside of their home. Patient has working smoke alarms and has working carbon monoxide detector. Home safety hazards include: loose  rugs on the floor.     Nutrition:   Current diet is Other (please comment). Appetite fluctuates daily    Medications:   Patient is currently taking over-the-counter supplements. OTC medications include: see medication list. Patient is able to manage medications.     Activities of Daily Living (ADLs)/Instrumental Activities of Daily Living (IADLs):   Walk and transfer into and out of bed and chair?: Yes  Dress and groom yourself?: Yes    Bathe or shower yourself?: Yes    Feed yourself? Yes  Do your laundry/housekeeping?: Yes  Manage your money, pay your bills and track your expenses?: Yes  Make your own meals?: Yes    Do your own shopping?: Yes    Previous Hospitalizations:   Any hospitalizations or ED visits within the last 12 months?: Yes    How many hospitalizations have you had in the last year?: 1-2    Hospitalization Comments: Fractured wrist resulting from fall    Advance Care Planning:   Living will: Yes    Durable POA for healthcare: Yes    Advanced directive: Yes      Cognitive Screening:   Provider or family/friend/caregiver concerned regarding cognition?: No    PREVENTIVE SCREENINGS      Cardiovascular Screening:    General: Screening Current      Diabetes Screening:     General: Screening Current      Colorectal Cancer Screening:     General: Screening Current      Breast Cancer Screening:     General: Risks and Benefits Discussed    Due for: Mammogram        Osteoporosis Screening:    General: Screening Not Indicated and History Osteoporosis      Abdominal Aortic Aneurysm (AAA) Screening:        General: Screening Not Indicated      Lung Cancer Screening:     General: Screening Not Indicated      Hepatitis C Screening:    General: Screening Current    Screening, Brief Intervention, and Referral to Treatment (SBIRT)    Screening  Typical number of drinks in a day: 2  Typical number of drinks in a week: 14  Interpretation: Low risk drinking behavior.    AUDIT-C Screenin) How often did you have a  "drink containing alcohol in the past year? 4 or more times a week  2) How many drinks did you have on a typical day when you were drinking in the past year? 1 to 2  3) How often did you have 6 or more drinks on one occasion in the past year? never    AUDIT-C Score: 4  Interpretation: Score 3-12 (female): POSITIVE screen for alcohol misuse    AUDIT Screenin) How often during the last year have you found that you were not able to stop drinking once you had started? 0 - never  5) How often during the last year have you failed to do what was normally expected from you because of drinking? 0 - never  6) How often during the last year have you needed a first drink in the morning to get yourself going after a heavy drinking session? 0 - never  7) How often during the last year have you had a feeling of guilt or remorse after drinking? 0 - never  8) How often during the last year have you been unable to remember what happened the night before because you had been drinking? 0 - never  9) Have you or someone else been injured as a result of your drinking? 0 - no  10) Has a relative or friend or a doctor or another health worker been concerned about your drinking or suggested you cut down? 0 - no    AUDIT Score: 4  Interpretation: Low risk alcohol consumption    Single Item Drug Screening:  How often have you used an illegal drug (including marijuana) or a prescription medication for non-medical reasons in the past year? weekly    Single Item Drug Screen Score: 3  Interpretation: POSITIVE screen for possible drug use disorder    Drug Abuse Screening Test (DAST-10):  1) Have you used drugs other than those required for medical reasons? No  2) Do you abuse more than one drug at a time? No  3) Are you always able to stop using drugs when you want to? Yes  4) Have you had \"blackouts\" or \"flashbacks\" as a result of drug use? No  5) Do you ever feel bad or guilty about your drug use? No  6) Does your spouse (or parents) ever " "complain about your involvement with drugs? No  7) Have you neglected your family because of your use of drugs? No  8) Have you engaged in illegal activities in order to obtain drugs? No  9) Have you ever experienced withdrawal symptoms (felt sick) when you stopped taking drugs? No  10) Have you had medical problems as a result of your drug use (e.g., memory loss, hepatitis, convulsions, bleeding, etc.)? No    DAST-10 Score: 0  Interpretation: No problems reported    Other Counseling Topics:   Car/seat belt/driving safety, skin self-exam, sunscreen and regular weightbearing exercise and calcium and vitamin D intake.     No results found.     Physical Exam:     /68   Pulse 89   Temp 98.7 °F (37.1 °C) (Tympanic)   Ht 5' 7\" (1.702 m)   Wt 68 kg (150 lb)   SpO2 90%   BMI 23.49 kg/m²     Physical Exam  Vitals and nursing note reviewed.   Constitutional:       Appearance: Normal appearance. She is well-developed.   HENT:      Head: Normocephalic and atraumatic.      Right Ear: Tympanic membrane, ear canal and external ear normal.      Left Ear: Tympanic membrane, ear canal and external ear normal.      Nose: Nose normal.      Mouth/Throat:      Mouth: Mucous membranes are moist.   Eyes:      General: Lids are normal. Vision grossly intact.      Conjunctiva/sclera: Conjunctivae normal.      Pupils: Pupils are equal, round, and reactive to light.   Cardiovascular:      Rate and Rhythm: Normal rate and regular rhythm.      Pulses: Normal pulses.      Heart sounds: Normal heart sounds, S1 normal and S2 normal.      No friction rub. No gallop. No S3 sounds.   Pulmonary:      Effort: Pulmonary effort is normal.      Breath sounds: Normal breath sounds.   Abdominal:      General: Bowel sounds are normal.      Palpations: Abdomen is soft.      Tenderness: There is no abdominal tenderness.   Genitourinary:     Comments: Deferred  Musculoskeletal:         General: Normal range of motion.      Cervical back: Normal range " of motion and neck supple.      Right lower leg: No edema.      Left lower leg: No edema.   Lymphadenopathy:      Cervical: No cervical adenopathy.   Skin:     General: Skin is warm and dry.      Capillary Refill: Capillary refill takes less than 2 seconds.   Neurological:      General: No focal deficit present.      Mental Status: She is alert and oriented to person, place, and time.      Motor: Motor function is intact.      Gait: Gait is intact.   Psychiatric:         Attention and Perception: Attention and perception normal.         Mood and Affect: Mood and affect normal.         Speech: Speech normal.         Behavior: Behavior normal. Behavior is cooperative.         Thought Content: Thought content normal.         Cognition and Memory: Cognition and memory normal.         Judgment: Judgment normal.          Appointment on 01/25/2024   Component Date Value Ref Range Status   • Hepatitis C Ab 01/25/2024 Non-reactive  Non-Reactive Final   • HIV-1 p24 Antigen 01/25/2024 Non-Reactive  Non-Reactive Final   • HIV-1 Antibody 01/25/2024 Non-Reactive  Non-Reactive Final   • HIV-2 Antibody 01/25/2024 Non-Reactive  Non-Reactive Final   • HIV Ag-Ab 5th Gen 01/25/2024 Non-Reactive  Non-Reactive Final    A Non-Reactive test result does not preclude the possibility of exposure or infection with HIV-1 and/or HIV-2.  Non-Reactive results can occur if the quantity of marker present is below the detection limits or is not present during the stage of disease in which a sample is collected. Repeat testing should be considered where there is clinical suspicion of infection.      • Cholesterol 01/25/2024 185  See Comment mg/dL Final    Cholesterol:         Pediatric <18 Years        Desirable          <170 mg/dL      Borderline High    170-199 mg/dL      High               >=200 mg/dL        Adult >=18 Years            Desirable         <200 mg/dL      Borderline High   200-239 mg/dL      High              >239 mg/dL     •  Triglycerides 01/25/2024 96  See Comment mg/dL Final    Triglyceride:     0-9Y            <75mg/dL     10Y-17Y         <90 mg/dL       >=18Y     Normal          <150 mg/dL     Borderline High 150-199 mg/dL     High            200-499 mg/dL        Very High       >499 mg/dL    Specimen collection should occur prior to Metamizole administration due to the potential for falsely depressed results.   • HDL, Direct 01/25/2024 78  >=50 mg/dL Final   • LDL Calculated 01/25/2024 88  0 - 100 mg/dL Final    LDL Cholesterol:     Optimal           <100 mg/dl     Near Optimal      100-129 mg/dl     Above Optimal       Borderline High 130-159 mg/dl       High            160-189 mg/dl       Very High       >189 mg/dl         This screening LDL is a calculated result.   It does not have the accuracy of the Direct Measured LDL in the monitoring of patients with hyperlipidemia and/or statin therapy.   Direct Measure LDL (JNY511) must be ordered separately in these patients.   • WBC 01/25/2024 9.27  4.31 - 10.16 Thousand/uL Final   • RBC 01/25/2024 4.77  3.81 - 5.12 Million/uL Final   • Hemoglobin 01/25/2024 14.9  11.5 - 15.4 g/dL Final   • Hematocrit 01/25/2024 45.1  34.8 - 46.1 % Final   • MCV 01/25/2024 95  82 - 98 fL Final   • MCH 01/25/2024 31.2  26.8 - 34.3 pg Final   • MCHC 01/25/2024 33.0  31.4 - 37.4 g/dL Final   • RDW 01/25/2024 12.4  11.6 - 15.1 % Final   • MPV 01/25/2024 9.9  8.9 - 12.7 fL Final   • Platelets 01/25/2024 400 (H)  149 - 390 Thousands/uL Final   • nRBC 01/25/2024 0  /100 WBCs Final   • Neutrophils Relative 01/25/2024 65  43 - 75 % Final   • Immat GRANS % 01/25/2024 0  0 - 2 % Final   • Lymphocytes Relative 01/25/2024 26  14 - 44 % Final   • Monocytes Relative 01/25/2024 7  4 - 12 % Final   • Eosinophils Relative 01/25/2024 1  0 - 6 % Final   • Basophils Relative 01/25/2024 1  0 - 1 % Final   • Neutrophils Absolute 01/25/2024 6.11  1.85 - 7.62 Thousands/µL Final   • Immature Grans Absolute 01/25/2024 0.03  0.00  - 0.20 Thousand/uL Final   • Lymphocytes Absolute 01/25/2024 2.36  0.60 - 4.47 Thousands/µL Final   • Monocytes Absolute 01/25/2024 0.62  0.17 - 1.22 Thousand/µL Final   • Eosinophils Absolute 01/25/2024 0.09  0.00 - 0.61 Thousand/µL Final   • Basophils Absolute 01/25/2024 0.06  0.00 - 0.10 Thousands/µL Final   • Sodium 01/25/2024 137  135 - 147 mmol/L Final   • Potassium 01/25/2024 4.4  3.5 - 5.3 mmol/L Final   • Chloride 01/25/2024 98  96 - 108 mmol/L Final   • CO2 01/25/2024 29  21 - 32 mmol/L Final   • ANION GAP 01/25/2024 10  mmol/L Final   • BUN 01/25/2024 9  5 - 25 mg/dL Final   • Creatinine 01/25/2024 0.75  0.60 - 1.30 mg/dL Final    Standardized to IDMS reference method   • Glucose, Fasting 01/25/2024 91  65 - 99 mg/dL Final   • Calcium 01/25/2024 9.8  8.4 - 10.2 mg/dL Final   • AST 01/25/2024 17  13 - 39 U/L Final   • ALT 01/25/2024 10  7 - 52 U/L Final    Specimen collection should occur prior to Sulfasalazine administration due to the potential for falsely depressed results.    • Alkaline Phosphatase 01/25/2024 56  34 - 104 U/L Final   • Total Protein 01/25/2024 7.0  6.4 - 8.4 g/dL Final   • Albumin 01/25/2024 4.5  3.5 - 5.0 g/dL Final   • Total Bilirubin 01/25/2024 0.69  0.20 - 1.00 mg/dL Final    Use of this assay is not recommended for patients undergoing treatment with eltrombopag due to the potential for falsely elevated results.  N-acetyl-p-benzoquinone imine (metabolite of Acetaminophen) will generate erroneously low results in samples for patients that have taken an overdose of Acetaminophen.   • eGFR 01/25/2024 84  ml/min/1.73sq m Final   • TSH 3RD GENERATON 01/25/2024 2.249  0.450 - 4.500 uIU/mL Final    The recommended reference ranges for TSH during pregnancy are as follows:   First trimester 0.100 to 2.500 uIU/mL   Second trimester  0.200 to 3.000 uIU/mL   Third trimester 0.300 to 3.000 uIU/m    Note: Normal ranges may not apply to patients who are transgender, non-binary, or whose legal  sex, sex at birth, and gender identity differ.  Adult TSH (3rd generation) reference range follows the recommended guidelines of the [employer], January, 2020.     I have reviewed all the lab results. There are some abnormalities that are not critical to the patient's health, I have discussed these in person at this office appointment.    GOVIND Gallegos

## 2024-01-30 NOTE — PATIENT INSTRUCTIONS
Medicare Preventive Visit Patient Instructions  Thank you for completing your Welcome to Medicare Visit or Medicare Annual Wellness Visit today. Your next wellness visit will be due in one year (1/30/2025).  The screening/preventive services that you may require over the next 5-10 years are detailed below. Some tests may not apply to you based off risk factors and/or age. Screening tests ordered at today's visit but not completed yet may show as past due. Also, please note that scanned in results may not display below.  Preventive Screenings:  Service Recommendations Previous Testing/Comments   Colorectal Cancer Screening  * Colonoscopy    * Fecal Occult Blood Test (FOBT)/Fecal Immunochemical Test (FIT)  * Fecal DNA/Cologuard Test  * Flexible Sigmoidoscopy Age: 45-75 years old   Colonoscopy: every 10 years (may be performed more frequently if at higher risk)  OR  FOBT/FIT: every 1 year  OR  Cologuard: every 3 years  OR  Sigmoidoscopy: every 5 years  Screening may be recommended earlier than age 45 if at higher risk for colorectal cancer. Also, an individualized decision between you and your healthcare provider will decide whether screening between the ages of 76-85 would be appropriate. Colonoscopy: 12/04/2018  FOBT/FIT: Not on file  Cologuard: Not on file  Sigmoidoscopy: Not on file    Screening Current     Breast Cancer Screening Age: 40+ years old  Frequency: every 1-2 years  Not required if history of left and right mastectomy Mammogram: 03/04/2009        Cervical Cancer Screening Between the ages of 21-29, pap smear recommended once every 3 years.   Between the ages of 30-65, can perform pap smear with HPV co-testing every 5 years.   Recommendations may differ for women with a history of total hysterectomy, cervical cancer, or abnormal pap smears in past. Pap Smear: Not on file        Hepatitis C Screening Once for adults born between 1945 and 1965  More frequently in patients at high risk for Hepatitis C Hep C  Antibody: 01/25/2024    Screening Current   Diabetes Screening 1-2 times per year if you're at risk for diabetes or have pre-diabetes Fasting glucose: 91 mg/dL (1/25/2024)  A1C: No results in last 5 years (No results in last 5 years)  Screening Current   Cholesterol Screening Once every 5 years if you don't have a lipid disorder. May order more often based on risk factors. Lipid panel: 01/25/2024    Screening Current     Other Preventive Screenings Covered by Medicare:  Abdominal Aortic Aneurysm (AAA) Screening: covered once if your at risk. You're considered to be at risk if you have a family history of AAA.  Lung Cancer Screening: covers low dose CT scan once per year if you meet all of the following conditions: (1) Age 55-77; (2) No signs or symptoms of lung cancer; (3) Current smoker or have quit smoking within the last 15 years; (4) You have a tobacco smoking history of at least 20 pack years (packs per day multiplied by number of years you smoked); (5) You get a written order from a healthcare provider.  Glaucoma Screening: covered annually if you're considered high risk: (1) You have diabetes OR (2) Family history of glaucoma OR (3)  aged 50 and older OR (4)  American aged 65 and older  Osteoporosis Screening: covered every 2 years if you meet one of the following conditions: (1) You're estrogen deficient and at risk for osteoporosis based off medical history and other findings; (2) Have a vertebral abnormality; (3) On glucocorticoid therapy for more than 3 months; (4) Have primary hyperparathyroidism; (5) On osteoporosis medications and need to assess response to drug therapy.   Last bone density test (DXA Scan): Not on file.  HIV Screening: covered annually if you're between the age of 15-65. Also covered annually if you are younger than 15 and older than 65 with risk factors for HIV infection. For pregnant patients, it is covered up to 3 times per  pregnancy.    Immunizations:  Immunization Recommendations   Influenza Vaccine Annual influenza vaccination during flu season is recommended for all persons aged >= 6 months who do not have contraindications   Pneumococcal Vaccine   * Pneumococcal conjugate vaccine = PCV13 (Prevnar 13), PCV15 (Vaxneuvance), PCV20 (Prevnar 20)  * Pneumococcal polysaccharide vaccine = PPSV23 (Pneumovax) Adults 19-63 yo with certain risk factors or if 65+ yo  If never received any pneumonia vaccine: recommend Prevnar 20 (PCV20)  Give PCV20 if previously received 1 dose of PCV13 or PPSV23   Hepatitis B Vaccine 3 dose series if at intermediate or high risk (ex: diabetes, end stage renal disease, liver disease)   Respiratory syncytial virus (RSV) Vaccine - COVERED BY MEDICARE PART D  * RSVPreF3 (Arexvy) CDC recommends that adults 60 years of age and older may receive a single dose of RSV vaccine using shared clinical decision-making (SCDM)   Tetanus (Td) Vaccine - COST NOT COVERED BY MEDICARE PART B Following completion of primary series, a booster dose should be given every 10 years to maintain immunity against tetanus. Td may also be given as tetanus wound prophylaxis.   Tdap Vaccine - COST NOT COVERED BY MEDICARE PART B Recommended at least once for all adults. For pregnant patients, recommended with each pregnancy.   Shingles Vaccine (Shingrix) - COST NOT COVERED BY MEDICARE PART B  2 shot series recommended in those 19 years and older who have or will have weakened immune systems or those 50 years and older     Health Maintenance Due:      Topic Date Due   • Cervical Cancer Screening  Never done   • Breast Cancer Screening: Mammogram  Never done   • Colorectal Cancer Screening  12/04/2028   • HIV Screening  Completed   • Hepatitis C Screening  Completed     Immunizations Due:      Topic Date Due   • COVID-19 Vaccine (1) Never done   • Influenza Vaccine (1) Never done     Advance Directives   What are advance directives?  Advance  directives are legal documents that state your wishes and plans for medical care. These plans are made ahead of time in case you lose your ability to make decisions for yourself. Advance directives can apply to any medical decision, such as the treatments you want, and if you want to donate organs.   What are the types of advance directives?  There are many types of advance directives, and each state has rules about how to use them. You may choose a combination of any of the following:  Living will:  This is a written record of the treatment you want. You can also choose which treatments you do not want, which to limit, and which to stop at a certain time. This includes surgery, medicine, IV fluid, and tube feedings.   Durable power of  for healthcare (DPAHC):  This is a written record that states who you want to make healthcare choices for you when you are unable to make them for yourself. This person, called a proxy, is usually a family member or a friend. You may choose more than 1 proxy.  Do not resuscitate (DNR) order:  A DNR order is used in case your heart stops beating or you stop breathing. It is a request not to have certain forms of treatment, such as CPR. A DNR order may be included in other types of advance directives.  Medical directive:  This covers the care that you want if you are in a coma, near death, or unable to make decisions for yourself. You can list the treatments you want for each condition. Treatment may include pain medicine, surgery, blood transfusions, dialysis, IV or tube feedings, and a ventilator (breathing machine).  Values history:  This document has questions about your views, beliefs, and how you feel and think about life. This information can help others choose the care that you would choose.  Why are advance directives important?  An advance directive helps you control your care. Although spoken wishes may be used, it is better to have your wishes written down. Spoken  wishes can be misunderstood, or not followed. Treatments may be given even if you do not want them. An advance directive may make it easier for your family to make difficult choices about your care.   Cigarette Smoking and Your Health   Risks to your health if you smoke:  Nicotine and other chemicals found in tobacco damage every cell in your body. Even if you are a light smoker, you have an increased risk for cancer, heart disease, and lung disease. If you are pregnant or have diabetes, smoking increases your risk for complications.   Benefits to your health if you stop smoking:   You decrease respiratory symptoms such as coughing, wheezing, and shortness of breath.   You reduce your risk for cancers of the lung, mouth, throat, kidney, bladder, pancreas, stomach, and cervix. If you already have cancer, you increase the benefits of chemotherapy. You also reduce your risk for cancer returning or a second cancer from developing.   You reduce your risk for heart disease, blood clots, heart attack, and stroke.   You reduce your risk for lung infections, and diseases such as pneumonia, asthma, chronic bronchitis, and emphysema.  Your circulation improves. More oxygen can be delivered to your body. If you have diabetes, you lower your risk for complications, such as kidney, artery, and eye diseases. You also lower your risk for nerve damage. Nerve damage can lead to amputations, poor vision, and blindness.  You improve your body's ability to heal and to fight infections.  For more information and support to stop smoking:   [employer]  Phone: 7- 061 - 270-7291  Web Address: www.[employer]  Alcohol Use and Your Health    Drinking too much can harm your health.  Excessive alcohol use leads to about 88,000 death in the [place] each year, and shortens the life of those who diet by almost 30 years.  Further, excessive drinking cost the economy $249 billion in 2010.  Most excessive drinkers are not alcohol  "dependent.    Excessive alcohol use has immediate effects that increase the risk of many harmful health conditions.  These are most often the result of binge drinking.  Over time, excessive alcohol use can lead to the development of chronic diseases and other series health problems.    What is considered a \"drink\"?        Excessive alcohol use includes:  Binge Drinking: For women, 4 or more drinks consumed on one occasion. For men, 5 or more drinks consumed on one occasion.  Heavy Drinking: For women, 8 or more drinks per week. For men, 15 or more drinks per week  Any alcohol used by pregnant women  Any alcohol used by those under the age of 21 years    If you choose to drink, do so in moderation:  Do not drink at all if you are under the age of 21, or if you are or may be pregnant, or have health problems that could be made worse by drinking.  For women, up to 1 drink per day  For men, up to 2 drinks a day    No one should begin drinking or drink more frequently based on potential health benefits    Short-Term Health Risks:  Injuries: motor vehicle crashes, falls, drownings, burns  Violence: homicide, suicide, sexual assault, intimate partner violence  Alcohol poisoning  Reproductive health: risky sexual behaviors, unintended prengnacy, sexually transmitted diseases, miscarriage, stillbirth, fetal alcohol syndrome    Long-Term Health Risks:  Chronic diseases: high blood pressure, heart disease, stroke, liver disease, digestive problems  Cancers: breast, mouth and throat, liver, colon  Learning and memory problems: dementia, poor school performance  Mental health: depression, anxiety, insomnia  Social problems: lost productivity, family problems, unemployment  Alcohol dependence    For support and more information:  Substance Abuse and Mental Health Services Administration  [address]  [place] [address]  Web Address: http://www.samhsa.gov    Alcoholics Anonymous        Web Address: " http://www.aa.org    https://www.cdc.gov/alcohol/fact-sheets/alcohol-use.htm     © Copyright [employer] 2018 Information is for End User's use only and may not be sold, redistributed or otherwise used for commercial purposes. All illustrations and images included in CareNotes® are the copyrighted property of [employer] or [employer] [facility]

## 2024-02-20 DIAGNOSIS — Z48.89 AFTERCARE FOLLOWING SURGERY: Primary | ICD-10-CM

## 2024-02-22 DIAGNOSIS — F51.01 PRIMARY INSOMNIA: ICD-10-CM

## 2024-02-22 RX ORDER — ZOLPIDEM TARTRATE 12.5 MG/1
12.5 TABLET, FILM COATED, EXTENDED RELEASE ORAL
Qty: 30 TABLET | Refills: 0 | Status: SHIPPED | OUTPATIENT
Start: 2024-02-22

## 2024-02-28 ENCOUNTER — HOSPITAL ENCOUNTER (OUTPATIENT)
Dept: RADIOLOGY | Facility: HOSPITAL | Age: 64
Discharge: HOME | End: 2024-02-28
Attending: ORTHOPAEDIC SURGERY
Payer: MEDICARE

## 2024-02-28 ENCOUNTER — OFFICE VISIT (OUTPATIENT)
Dept: OBGYN CLINIC | Facility: HOSPITAL | Age: 64
End: 2024-02-28
Payer: MEDICARE

## 2024-02-28 VITALS
SYSTOLIC BLOOD PRESSURE: 130 MMHG | WEIGHT: 150 LBS | DIASTOLIC BLOOD PRESSURE: 73 MMHG | BODY MASS INDEX: 23.54 KG/M2 | HEART RATE: 96 BPM | HEIGHT: 67 IN

## 2024-02-28 DIAGNOSIS — S52.502A CLOSED FRACTURE OF DISTAL END OF LEFT RADIUS, UNSPECIFIED FRACTURE MORPHOLOGY, INITIAL ENCOUNTER: Primary | ICD-10-CM

## 2024-02-28 DIAGNOSIS — Z48.89 AFTERCARE FOLLOWING SURGERY: ICD-10-CM

## 2024-02-28 PROCEDURE — 73110 X-RAY EXAM OF WRIST: CPT

## 2024-02-28 PROCEDURE — 99213 OFFICE O/P EST LOW 20 MIN: CPT | Performed by: ORTHOPAEDIC SURGERY

## 2024-02-28 NOTE — PROGRESS NOTES
"Assessment:   S/P Open reduction internal fixation left intra-articular 3+ part distal radius fracture - Left, Left open carpal tunnel release - Left, and Application left short arm splint - Left on 11/14/2023    Plan:   It was discussed with the patient to continue home exercises  She was advised about crepitation over the incision and that if she were to have pain and a sandpaperlike feel that we may have to remove the hardware in the future.  She is to give us a call in regards to any of those symptoms  X-ray was reviewed with the patient in detail  She will follow-up with us as needed    Follow Up:  PRN    To Do Next Visit:  Reevaluation      CHIEF COMPLAINT:  Chief Complaint   Patient presents with    Left Wrist - Follow-up     S/P ORIF, L ECTR 11/14/23              SUBJECTIVE:  Doreen Barroso is a 63 y.o. female who presents for follow up after Open reduction internal fixation left intra-articular 3+ part distal radius fracture - Left, Left open carpal tunnel release - Left, and Application left short arm splint - Left on 11/14/2023.  Today patient has no pain or discomfort.  She states she has been working on her home exercise program.       PHYSICAL EXAMINATION:  Vital signs: /73   Pulse 96   Ht 5' 7\" (1.702 m)   Wt 68 kg (150 lb)   BMI 23.49 kg/m²   General: well developed and well nourished, alert, oriented times 3, and appears comfortable  Psychiatric: Normal    MUSCULOSKELETAL EXAMINATION:  Incision: clean, dry, and healed  Range of Motion: As expected, opposition intact, full composite fist possible, and patient has near full range of motion with flexion and extension of the wrist compared to the contralateral side  Neurovascular status: Neuro intact, good cap refill  Activity Restrictions: No restrictions         STUDIES REVIEWED:  Images were reviewed in PACS from 2/28/2024 of the left wrist which demonstrate hardware in appropriate alignment without evidence of loosening.  Fracture is " healing well with callus formation noted      PROCEDURES PERFORMED:  Procedures  No Procedures performed today

## 2024-03-29 DIAGNOSIS — F51.01 PRIMARY INSOMNIA: ICD-10-CM

## 2024-03-29 NOTE — TELEPHONE ENCOUNTER
Pt called for script refill zolpidem 12.5mg-prefers 90 supply since it comes [facility] but if only 30 days available, that's ok too.

## 2024-04-02 RX ORDER — ZOLPIDEM TARTRATE 12.5 MG/1
12.5 TABLET, FILM COATED, EXTENDED RELEASE ORAL
Qty: 30 TABLET | Refills: 0 | Status: SHIPPED | OUTPATIENT
Start: 2024-04-02 | End: 2024-04-11 | Stop reason: SDUPTHER

## 2024-04-11 DIAGNOSIS — F51.01 PRIMARY INSOMNIA: ICD-10-CM

## 2024-04-11 RX ORDER — ZOLPIDEM TARTRATE 12.5 MG/1
12.5 TABLET, FILM COATED, EXTENDED RELEASE ORAL
Qty: 30 TABLET | Refills: 1 | Status: SHIPPED | OUTPATIENT
Start: 2024-04-11

## 2024-04-11 NOTE — TELEPHONE ENCOUNTER
It was sent to [facility] by mistake but she doesn't use them can you send it to [facility] for a 90 day supply if not [facility] in [place] for the 30 day supply

## 2024-04-11 NOTE — TELEPHONE ENCOUNTER
"This is an \"as needed medication\" and therefore there is no 90 supply.  But I will put in a refill.   "

## 2024-05-22 DIAGNOSIS — I10 PRIMARY HYPERTENSION: ICD-10-CM

## 2024-05-23 RX ORDER — LOSARTAN POTASSIUM 100 MG/1
100 TABLET ORAL DAILY
Qty: 90 TABLET | Refills: 1 | Status: SHIPPED | OUTPATIENT
Start: 2024-05-23

## 2024-05-30 DIAGNOSIS — F51.01 PRIMARY INSOMNIA: ICD-10-CM

## 2024-05-30 DIAGNOSIS — I10 PRIMARY HYPERTENSION: ICD-10-CM

## 2024-05-31 RX ORDER — AMLODIPINE BESYLATE 10 MG/1
10 TABLET ORAL DAILY
Qty: 90 TABLET | Refills: 3 | Status: SHIPPED | OUTPATIENT
Start: 2024-05-31

## 2024-05-31 RX ORDER — ZOLPIDEM TARTRATE 12.5 MG/1
12.5 TABLET, FILM COATED, EXTENDED RELEASE ORAL
Qty: 30 TABLET | Refills: 0 | Status: SHIPPED | OUTPATIENT
Start: 2024-05-31

## 2024-06-12 DIAGNOSIS — E03.9 ACQUIRED HYPOTHYROIDISM: ICD-10-CM

## 2024-06-12 RX ORDER — LEVOTHYROXINE SODIUM 112 UG/1
112 TABLET ORAL DAILY
Qty: 90 TABLET | Refills: 1 | Status: SHIPPED | OUTPATIENT
Start: 2024-06-12

## 2024-06-13 ENCOUNTER — TELEPHONE (OUTPATIENT)
Age: 64
End: 2024-06-13

## 2024-06-13 NOTE — TELEPHONE ENCOUNTER
Pt called in.  States she has an infected hair follicle on the right side of her groin.  States its elevated about half an inch and falls right on the underwear line.  Pt states it is very red, warm to touch and tender.  Denies any fevers.    Pt is currently in [place] and doesn't go back home till tomorrow or the next day.  Preferred pharmacy pended.  Please review and advise how to proceed.

## 2024-06-13 NOTE — TELEPHONE ENCOUNTER
Pt reports she is in urgent care for the reported issue in previous message. Pt wanted to let the PCP know.

## 2024-06-13 NOTE — TELEPHONE ENCOUNTER
Spoke to her she won't be back to [place] till tomorrow early afternoon. She was hoping she could just send you a picture of it and you could give her an antibiotic. Her friend who is a [place] told her if she goes to [facility] they will tell her warm compresses and an antibiotic is all they will do for her.   I did tell her if there is a cancellation I will call her to get her in for the afternoon but if not then [facility] is a good alternative (she really doesn't want to go there)

## 2024-08-01 ENCOUNTER — OFFICE VISIT (OUTPATIENT)
Dept: FAMILY MEDICINE CLINIC | Facility: CLINIC | Age: 64
End: 2024-08-01
Payer: MEDICARE

## 2024-08-01 VITALS
TEMPERATURE: 98.1 F | HEART RATE: 86 BPM | DIASTOLIC BLOOD PRESSURE: 74 MMHG | SYSTOLIC BLOOD PRESSURE: 120 MMHG | HEIGHT: 67 IN | WEIGHT: 154.6 LBS | BODY MASS INDEX: 24.27 KG/M2 | OXYGEN SATURATION: 99 %

## 2024-08-01 DIAGNOSIS — M85.852 OSTEOPENIA OF LEFT HIP: ICD-10-CM

## 2024-08-01 DIAGNOSIS — M54.12 CERVICAL MYELOPATHY WITH CERVICAL RADICULOPATHY  (HCC): ICD-10-CM

## 2024-08-01 DIAGNOSIS — F33.40 RECURRENT MAJOR DEPRESSIVE DISORDER, IN REMISSION (HCC): ICD-10-CM

## 2024-08-01 DIAGNOSIS — Z13.0 SCREENING FOR DEFICIENCY ANEMIA: ICD-10-CM

## 2024-08-01 DIAGNOSIS — Z13.1 SCREENING FOR DIABETES MELLITUS: ICD-10-CM

## 2024-08-01 DIAGNOSIS — Z13.31 DEPRESSION SCREENING NEGATIVE: ICD-10-CM

## 2024-08-01 DIAGNOSIS — K21.9 GASTROESOPHAGEAL REFLUX DISEASE WITHOUT ESOPHAGITIS: ICD-10-CM

## 2024-08-01 DIAGNOSIS — M62.838 MUSCLE SPASMS OF NECK: ICD-10-CM

## 2024-08-01 DIAGNOSIS — M62.830 SPASM OF MUSCLE OF LOWER BACK: ICD-10-CM

## 2024-08-01 DIAGNOSIS — Z00.00 ANNUAL PHYSICAL EXAM: Primary | ICD-10-CM

## 2024-08-01 DIAGNOSIS — Z13.220 SCREENING FOR LIPID DISORDERS: ICD-10-CM

## 2024-08-01 DIAGNOSIS — I10 PRIMARY HYPERTENSION: ICD-10-CM

## 2024-08-01 DIAGNOSIS — Z13.29 SCREENING FOR THYROID DISORDER: ICD-10-CM

## 2024-08-01 DIAGNOSIS — E03.9 ACQUIRED HYPOTHYROIDISM: ICD-10-CM

## 2024-08-01 DIAGNOSIS — G95.9 CERVICAL MYELOPATHY WITH CERVICAL RADICULOPATHY  (HCC): ICD-10-CM

## 2024-08-01 PROCEDURE — 99214 OFFICE O/P EST MOD 30 MIN: CPT | Performed by: NURSE PRACTITIONER

## 2024-08-01 PROCEDURE — G2211 COMPLEX E/M VISIT ADD ON: HCPCS | Performed by: NURSE PRACTITIONER

## 2024-08-01 RX ORDER — CYCLOBENZAPRINE HCL 5 MG
10 TABLET ORAL
Qty: 90 TABLET | Refills: 1 | Status: SHIPPED | OUTPATIENT
Start: 2024-08-01

## 2024-08-01 RX ORDER — ESOMEPRAZOLE MAGNESIUM 40 MG/1
40 CAPSULE, DELAYED RELEASE ORAL
Qty: 90 CAPSULE | Refills: 3 | Status: SHIPPED | OUTPATIENT
Start: 2024-08-01

## 2024-08-01 RX ORDER — AMLODIPINE BESYLATE 10 MG/1
10 TABLET ORAL DAILY
Qty: 90 TABLET | Refills: 3 | Status: SHIPPED | OUTPATIENT
Start: 2024-08-01

## 2024-08-01 RX ORDER — DIAZEPAM 5 MG/1
5 TABLET ORAL
COMMUNITY
End: 2024-08-01

## 2024-08-01 RX ORDER — DULOXETIN HYDROCHLORIDE 30 MG/1
30 CAPSULE, DELAYED RELEASE ORAL DAILY
COMMUNITY
End: 2024-08-01

## 2024-08-01 NOTE — PROGRESS NOTES
Adult Annual Physical  Name: Doreen Barroso      : 1960      MRN: [id number]  Encounter Provider: GOVIND Gallegos  Encounter Date: 2024   Encounter department: [facility]    Assessment & Plan   1. Annual physical exam  2. Primary hypertension  -     amLODIPine (NORVASC) 10 mg tablet; Take 1 tablet (10 mg total) by mouth daily  3. Gastroesophageal reflux disease without esophagitis  -     esomeprazole (NexIUM) 40 MG capsule; Take 1 capsule (40 mg total) by mouth daily after breakfast  4. Acquired hypothyroidism  -     TSH, 3rd generation with Free T4 reflex; Future; Expected date: 2025  5. Recurrent major depressive disorder, in remission (HCC)  6. Depression screening negative  7. Osteopenia of left hip  8. Screening for thyroid disorder  9. Screening for deficiency anemia  -     CBC and differential; Future; Expected date: 2025  10. Screening for lipid disorders  -     Lipid Panel with Direct LDL reflex; Future; Expected date: 2025  11. Screening for diabetes mellitus  -     Comprehensive metabolic panel; Future; Expected date: 2025  12. Cervical myelopathy with cervical radiculopathy  (HCC)  Assessment & Plan:  Pt reports she feels when she extended neck that she has radiculopathy feeling from elbow to 4th and 5th digits of RH  13. Muscle spasms of neck  -     cyclobenzaprine (FLEXERIL) 5 mg tablet; Take 2 tablets (10 mg total) by mouth daily at bedtime  14. Spasm of muscle of lower back  -     cyclobenzaprine (FLEXERIL) 5 mg tablet; Take 2 tablets (10 mg total) by mouth daily at bedtime    Immunizations and preventive care screenings were discussed with patient today. Appropriate education was printed on patient's after visit summary.    Counseling:  Alcohol/drug use: discussed moderation in alcohol intake, the recommendations for healthy alcohol use, and avoidance of illicit drug use.  Dental Health: discussed importance of regular tooth  "brushing, flossing, and dental visits.  Injury prevention: discussed safety/seat belts, safety helmets, smoke detectors, carbon dioxide detectors, and smoking near bedding or upholstery.  Sexual health: discussed sexually transmitted diseases, partner selection, use of condoms, avoidance of unintended pregnancy, and contraceptive alternatives.  Exercise: the importance of regular exercise/physical activity was discussed. Recommend exercise 3-5 times per week for at least 30 minutes.       Depression Screening and Follow-up Plan: Patient was screened for depression during today's encounter. They screened negative with a PHQ-9 score of 0.        History of Present Illness       Patient presents with:  Follow-up: 6 month follow up. Would like to go over her mammogram and dexa scan results. Pt would like to have \"depression\" remove from her diagnosis.       Depression  Progression since onset: Pt reports she was Tx for Depression in her twenties in the 1980s, however duloxetine was Rx for small fiber neuropathy which was ineffective and D/C'ed by Rheumatologist. Treatments tried: H/O duloxetine regimen.       Adult Annual Physical:  Patient presents for annual physical.     Diet and Physical Activity:  - Diet/Nutrition: well balanced diet, adequate whole grain intake and adequate fiber intake.  - Exercise: walking.    Depression Screening:    - PHQ-9 Score: 0    General Health:  - Sleep:. gets 3-7 hours of sleep  - Hearing: normal hearing bilateral ears.  - Vision: wears glasses.  - Dental: regular dental visits.    /GYN Health:  - Follows with GYN: no.   - Menopause: postmenopausal.     Advanced Care Planning:  - Has an advanced directive?: no    - Has a durable medical POA?: no      Review of Systems   Psychiatric/Behavioral:  Positive for depression.    All other systems reviewed and are negative.    Medical History Reviewed by provider this encounter:  Tobacco  Allergies  Meds  Problems  Med Hx  Surg Hx  Fam " Hx       Past Medical History   Past Medical History:   Diagnosis Date   • ADHD    • Allergic 1975    Several allergies developed throughout my life   • Arthritis    • Cancer (HCC) 2001    R breast   • Disease of thyroid gland    • GERD (gastroesophageal reflux disease)    • Hypertension    • PONV (postoperative nausea and vomiting)    • Sleep apnea      Past Surgical History:   Procedure Laterality Date   • CERVICAL FUSION  2023   • COLONOSCOPY     • KNEE SURGERY Left    • LUMBAR FUSION     • ND APPLICATION SHORT ARM SPLINT FOREARM-HAND STATIC Left 11/14/2023    Procedure: Application left short arm splint;  Surgeon: Shawn Baez MD;  Location: [facility];  Service: Orthopedics   • ND NEUROPLASTY &/TRANSPOS MEDIAN NRV CARPAL TUNNE Left 11/14/2023    Procedure: Left open carpal tunnel release;  Surgeon: Shawn Baez MD;  Location: [facility];  Service: Orthopedics   • ND OPTX DSTL RADL I-ARTIC FX/EPIPHYSL SEP 3 FRAG Left 11/14/2023    Procedure: Open reduction internal fixation left intra-articular 3+ part distal radius fracture;  Surgeon: Shawn Baez MD;  Location: [facility];  Service: Orthopedics     Family History   Problem Relation Age of Onset   • Heart disease Mother    • Hypertension Mother    • Breast cancer Mother    • Cancer Mother         Breast cancer, HTN, fibromyalgia   • Suicide Attempts Mother    • Kidney cancer Father    • Myasthenia gravis Father    • Cancer Father         Myasthenia gravis, renal clear cell cancer   • Cancer Sister         Melanoma, familial type   • Cancer Brother         Diabetes, HTN, CAD     Current Outpatient Medications on File Prior to Visit   Medication Sig Dispense Refill   • azelastine (ASTELIN) 0.1 % nasal spray 1 spray into each nostril 2 (two) times a day 30 mL 2   • cetirizine (ZyrTEC) 10 mg tablet Take 10 mg by mouth daily     • clobetasol (TEMOVATE) 0.05 % cream Apply topically 2 (two) times a day 30 g 0   • cyanocobalamin (VITAMIN B-12) 2500  MCG TABS Take 2,500 mcg by mouth daily     • levothyroxine 112 mcg tablet TAKE 1 TABLET BY MOUTH DAILY 90 tablet 1   • losartan (COZAAR) 100 MG tablet TAKE 1 TABLET BY MOUTH DAILY 90 tablet 1   • zolpidem (AMBIEN CR) 12.5 MG CR tablet TAKE 1 TABLET BY MOUTH DAILY AT  BEDTIME AS NEEDED FOR SLEEP 30 tablet 0   • [DISCONTINUED] amLODIPine (NORVASC) 10 mg tablet TAKE 1 TABLET BY MOUTH DAILY 90 tablet 3   • [DISCONTINUED] cyclobenzaprine (FLEXERIL) 5 mg tablet Take 2 tablets (10 mg total) by mouth daily at bedtime 90 tablet 1   • [DISCONTINUED] esomeprazole (NexIUM) 40 MG capsule Take 40 mg by mouth daily after breakfast     • [DISCONTINUED] diazepam (VALIUM) 5 mg tablet Take 5 mg by mouth (Patient not taking: Reported on 8/1/2024)     • [DISCONTINUED] DULoxetine (CYMBALTA) 30 mg delayed release capsule Take 30 mg by mouth daily (Patient not taking: Reported on 8/1/2024)       No current facility-administered medications on file prior to visit.     Allergies   Allergen Reactions   • Fentanyl GI Intolerance   • Codeine Itching     And stomach cramps   • Belladonna Hives   • Chlorhexidine Hives, Itching and Rash     WIPES   • Other Hives   • Penicillins Hives     hives   hives   hives    hives   hives    hives    hives   hives   hives   hives   hives   According to [facility] ß-lactam allergy algorithm, this is a possible   Type 1 sensitivity reaction.   hives      Current Outpatient Medications on File Prior to Visit   Medication Sig Dispense Refill   • azelastine (ASTELIN) 0.1 % nasal spray 1 spray into each nostril 2 (two) times a day 30 mL 2   • cetirizine (ZyrTEC) 10 mg tablet Take 10 mg by mouth daily     • clobetasol (TEMOVATE) 0.05 % cream Apply topically 2 (two) times a day 30 g 0   • cyanocobalamin (VITAMIN B-12) 2500 MCG TABS Take 2,500 mcg by mouth daily     • levothyroxine 112 mcg tablet TAKE 1 TABLET BY MOUTH DAILY 90 tablet 1   • losartan (COZAAR) 100 MG tablet TAKE 1 TABLET BY MOUTH DAILY 90 tablet 1   • zolpidem  "(AMBIEN CR) 12.5 MG CR tablet TAKE 1 TABLET BY MOUTH DAILY AT  BEDTIME AS NEEDED FOR SLEEP 30 tablet 0   • [DISCONTINUED] amLODIPine (NORVASC) 10 mg tablet TAKE 1 TABLET BY MOUTH DAILY 90 tablet 3   • [DISCONTINUED] cyclobenzaprine (FLEXERIL) 5 mg tablet Take 2 tablets (10 mg total) by mouth daily at bedtime 90 tablet 1   • [DISCONTINUED] esomeprazole (NexIUM) 40 MG capsule Take 40 mg by mouth daily after breakfast     • [DISCONTINUED] diazepam (VALIUM) 5 mg tablet Take 5 mg by mouth (Patient not taking: Reported on 8/1/2024)     • [DISCONTINUED] DULoxetine (CYMBALTA) 30 mg delayed release capsule Take 30 mg by mouth daily (Patient not taking: Reported on 8/1/2024)       No current facility-administered medications on file prior to visit.      Social History     Tobacco Use   • Smoking status: Every Day     Current packs/day: 0.25     Average packs/day: 0.3 packs/day for 5.0 years (1.3 ttl pk-yrs)     Types: Cigars, Cigarettes   • Smokeless tobacco: Never   • Tobacco comments:     Smoked cigarettes x22 years. Quit 2000   Vaping Use   • Vaping status: Every Day   • Substances: THC   Substance and Sexual Activity   • Alcohol use: Yes     Alcohol/week: 2.0 standard drinks of alcohol     Types: 2 Shots of liquor per week     Comment: 2 daily bourbon drinks   • Drug use: Yes     Frequency: 7.0 times per week     Types: Marijuana     Comment: Medical marijuana pt since 2018 for chronic pain and sleep d   • Sexual activity: Not Currently     Partners: Female     Birth control/protection: None       Objective     /74   Pulse 86   Temp 98.1 °F (36.7 °C) (Tympanic)   Ht 5' 7\" (1.702 m)   Wt 70.1 kg (154 lb 9.6 oz)   SpO2 99%   BMI 24.21 kg/m²     Physical Exam  Vitals and nursing note reviewed.   Constitutional:       Appearance: Normal appearance. She is well-developed.   HENT:      Head: Normocephalic and atraumatic.      Right Ear: Tympanic membrane, ear canal and external ear normal.      Left Ear: Tympanic " membrane, ear canal and external ear normal.      Nose: Nose normal.      Mouth/Throat:      Mouth: Mucous membranes are moist.   Eyes:      General: Lids are normal. Vision grossly intact.      Conjunctiva/sclera: Conjunctivae normal.      Pupils: Pupils are equal, round, and reactive to light.   Cardiovascular:      Rate and Rhythm: Normal rate and regular rhythm.      Pulses: Normal pulses.      Heart sounds: Normal heart sounds, S1 normal and S2 normal.      No friction rub. No gallop. No S3 sounds.   Pulmonary:      Effort: Pulmonary effort is normal.      Breath sounds: Normal breath sounds.   Abdominal:      General: Bowel sounds are normal.      Palpations: Abdomen is soft.      Tenderness: There is no abdominal tenderness.   Genitourinary:     Comments: Deferred  Musculoskeletal:         General: Normal range of motion.      Cervical back: Normal range of motion and neck supple.      Right lower leg: No edema.      Left lower leg: No edema.   Lymphadenopathy:      Cervical: No cervical adenopathy.   Skin:     General: Skin is warm and dry.      Capillary Refill: Capillary refill takes less than 2 seconds.   Neurological:      General: No focal deficit present.      Mental Status: She is alert and oriented to person, place, and time.      Motor: Motor function is intact.      Gait: Gait is intact.   Psychiatric:         Attention and Perception: Attention and perception normal.         Mood and Affect: Mood and affect normal.         Speech: Speech normal.         Behavior: Behavior normal. Behavior is cooperative.         Thought Content: Thought content normal.         Cognition and Memory: Cognition and memory normal.         Judgment: Judgment normal.         No visits with results within 1 Month(s) from this visit.   Latest known visit with results is:   Appointment on 01/25/2024   Component Date Value Ref Range Status   • Hepatitis C Ab 01/25/2024 Non-reactive  Non-Reactive Final   • HIV-1 p24 Antigen  01/25/2024 Non-Reactive  Non-Reactive Final   • HIV-1 Antibody 01/25/2024 Non-Reactive  Non-Reactive Final   • HIV-2 Antibody 01/25/2024 Non-Reactive  Non-Reactive Final   • HIV Ag-Ab 5th Gen 01/25/2024 Non-Reactive  Non-Reactive Final    A Non-Reactive test result does not preclude the possibility of exposure or infection with HIV-1 and/or HIV-2.  Non-Reactive results can occur if the quantity of marker present is below the detection limits or is not present during the stage of disease in which a sample is collected. Repeat testing should be considered where there is clinical suspicion of infection.      • Cholesterol 01/25/2024 185  See Comment mg/dL Final    Cholesterol:         Pediatric <18 Years        Desirable          <170 mg/dL      Borderline High    170-199 mg/dL      High               >=200 mg/dL        Adult >=18 Years            Desirable         <200 mg/dL      Borderline High   200-239 mg/dL      High              >239 mg/dL     • Triglycerides 01/25/2024 96  See Comment mg/dL Final    Triglyceride:     0-9Y            <75mg/dL     10Y-17Y         <90 mg/dL       >=18Y     Normal          <150 mg/dL     Borderline High 150-199 mg/dL     High            200-499 mg/dL        Very High       >499 mg/dL    Specimen collection should occur prior to Metamizole administration due to the potential for falsely depressed results.   • HDL, Direct 01/25/2024 78  >=50 mg/dL Final   • LDL Calculated 01/25/2024 88  0 - 100 mg/dL Final    LDL Cholesterol:     Optimal           <100 mg/dl     Near Optimal      100-129 mg/dl     Above Optimal       Borderline High 130-159 mg/dl       High            160-189 mg/dl       Very High       >189 mg/dl         This screening LDL is a calculated result.   It does not have the accuracy of the Direct Measured LDL in the monitoring of patients with hyperlipidemia and/or statin therapy.   Direct Measure LDL (EXT231) must be ordered separately in these patients.   • WBC  01/25/2024 9.27  4.31 - 10.16 Thousand/uL Final   • RBC 01/25/2024 4.77  3.81 - 5.12 Million/uL Final   • Hemoglobin 01/25/2024 14.9  11.5 - 15.4 g/dL Final   • Hematocrit 01/25/2024 45.1  34.8 - 46.1 % Final   • MCV 01/25/2024 95  82 - 98 fL Final   • MCH 01/25/2024 31.2  26.8 - 34.3 pg Final   • MCHC 01/25/2024 33.0  31.4 - 37.4 g/dL Final   • RDW 01/25/2024 12.4  11.6 - 15.1 % Final   • MPV 01/25/2024 9.9  8.9 - 12.7 fL Final   • Platelets 01/25/2024 400 (H)  149 - 390 Thousands/uL Final   • nRBC 01/25/2024 0  /100 WBCs Final   • Segmented % 01/25/2024 65  43 - 75 % Final   • Immature Grans % 01/25/2024 0  0 - 2 % Final   • Lymphocytes % 01/25/2024 26  14 - 44 % Final   • Monocytes % 01/25/2024 7  4 - 12 % Final   • Eosinophils Relative 01/25/2024 1  0 - 6 % Final   • Basophils Relative 01/25/2024 1  0 - 1 % Final   • Absolute Neutrophils 01/25/2024 6.11  1.85 - 7.62 Thousands/µL Final   • Absolute Immature Grans 01/25/2024 0.03  0.00 - 0.20 Thousand/uL Final   • Absolute Lymphocytes 01/25/2024 2.36  0.60 - 4.47 Thousands/µL Final   • Absolute Monocytes 01/25/2024 0.62  0.17 - 1.22 Thousand/µL Final   • Eosinophils Absolute 01/25/2024 0.09  0.00 - 0.61 Thousand/µL Final   • Basophils Absolute 01/25/2024 0.06  0.00 - 0.10 Thousands/µL Final   • Sodium 01/25/2024 137  135 - 147 mmol/L Final   • Potassium 01/25/2024 4.4  3.5 - 5.3 mmol/L Final   • Chloride 01/25/2024 98  96 - 108 mmol/L Final   • CO2 01/25/2024 29  21 - 32 mmol/L Final   • ANION GAP 01/25/2024 10  mmol/L Final   • BUN 01/25/2024 9  5 - 25 mg/dL Final   • Creatinine 01/25/2024 0.75  0.60 - 1.30 mg/dL Final    Standardized to IDMS reference method   • Glucose, Fasting 01/25/2024 91  65 - 99 mg/dL Final   • Calcium 01/25/2024 9.8  8.4 - 10.2 mg/dL Final   • AST 01/25/2024 17  13 - 39 U/L Final   • ALT 01/25/2024 10  7 - 52 U/L Final    Specimen collection should occur prior to Sulfasalazine administration due to the potential for falsely depressed  results.    • Alkaline Phosphatase 01/25/2024 56  34 - 104 U/L Final   • Total Protein 01/25/2024 7.0  6.4 - 8.4 g/dL Final   • Albumin 01/25/2024 4.5  3.5 - 5.0 g/dL Final   • Total Bilirubin 01/25/2024 0.69  0.20 - 1.00 mg/dL Final    Use of this assay is not recommended for patients undergoing treatment with eltrombopag due to the potential for falsely elevated results.  N-acetyl-p-benzoquinone imine (metabolite of Acetaminophen) will generate erroneously low results in samples for patients that have taken an overdose of Acetaminophen.   • eGFR 01/25/2024 84  ml/min/1.73sq m Final   • TSH 3RD GENERATON 01/25/2024 2.249  0.450 - 4.500 uIU/mL Final    The recommended reference ranges for TSH during pregnancy are as follows:   First trimester 0.100 to 2.500 uIU/mL   Second trimester  0.200 to 3.000 uIU/mL   Third trimester 0.300 to 3.000 uIU/m    Note: Normal ranges may not apply to patients who are transgender, non-binary, or whose legal sex, sex at birth, and gender identity differ.  Adult TSH (3rd generation) reference range follows the recommended guidelines of the [employer], January, 2020.       I have reviewed all the lab results. There are some abnormalities that are not critical to the patient's health, I have discussed these in person at this office appointment.

## 2024-08-01 NOTE — ASSESSMENT & PLAN NOTE
Pt reports she feels when she extended neck that she has radiculopathy feeling from elbow to 4th and 5th digits of RH

## 2024-08-28 DIAGNOSIS — F51.01 PRIMARY INSOMNIA: ICD-10-CM

## 2024-08-28 RX ORDER — ZOLPIDEM TARTRATE 12.5 MG/1
12.5 TABLET, FILM COATED, EXTENDED RELEASE ORAL
Qty: 30 TABLET | Refills: 0 | Status: SHIPPED | OUTPATIENT
Start: 2024-08-28

## 2024-09-04 ENCOUNTER — TELEPHONE (OUTPATIENT)
Age: 64
End: 2024-09-04

## 2024-09-04 NOTE — TELEPHONE ENCOUNTER
Patient needs an AMB referral to Neurology before she can be scheduled.  Please send to below:    Sacha Flowers,   Fax:  [phone]

## 2024-09-05 DIAGNOSIS — R90.82 WHITE MATTER ABNORMALITY ON MRI OF BRAIN: Primary | ICD-10-CM

## 2024-09-25 ENCOUNTER — TELEPHONE (OUTPATIENT)
Age: 64
End: 2024-09-25

## 2024-10-02 DIAGNOSIS — F51.01 PRIMARY INSOMNIA: ICD-10-CM

## 2024-10-03 RX ORDER — ZOLPIDEM TARTRATE 12.5 MG/1
12.5 TABLET, FILM COATED, EXTENDED RELEASE ORAL
Qty: 30 TABLET | Refills: 0 | Status: SHIPPED | OUTPATIENT
Start: 2024-10-03

## 2024-10-07 DIAGNOSIS — J30.2 SEASONAL ALLERGIES: ICD-10-CM

## 2024-10-07 RX ORDER — AZELASTINE 1 MG/ML
1 SPRAY, METERED NASAL 2 TIMES DAILY
Qty: 30 ML | Refills: 2 | Status: SHIPPED | OUTPATIENT
Start: 2024-10-07

## 2024-10-24 DIAGNOSIS — I10 PRIMARY HYPERTENSION: ICD-10-CM

## 2024-10-24 RX ORDER — LOSARTAN POTASSIUM 100 MG/1
100 TABLET ORAL DAILY
Qty: 90 TABLET | Refills: 1 | Status: SHIPPED | OUTPATIENT
Start: 2024-10-24

## 2024-11-01 ENCOUNTER — APPOINTMENT (OUTPATIENT)
Dept: LAB | Facility: CLINIC | Age: 64
End: 2024-11-01
Payer: MEDICARE

## 2024-11-01 DIAGNOSIS — H53.9 UNSPECIFIED VISUAL DISTURBANCE: ICD-10-CM

## 2024-11-01 DIAGNOSIS — M54.50 LOW BACK PAIN, UNSPECIFIED BACK PAIN LATERALITY, UNSPECIFIED CHRONICITY, UNSPECIFIED WHETHER SCIATICA PRESENT: ICD-10-CM

## 2024-11-01 DIAGNOSIS — G60.9 IDIOPATHIC PERIPHERAL NEUROPATHY: ICD-10-CM

## 2024-11-01 LAB — PROT SERPL-MCNC: 7.2 G/DL (ref 6.4–8.4)

## 2024-11-01 PROCEDURE — 84165 PROTEIN E-PHORESIS SERUM: CPT

## 2024-11-01 PROCEDURE — 82175 ASSAY OF ARSENIC: CPT

## 2024-11-01 PROCEDURE — 84425 ASSAY OF VITAMIN B-1: CPT

## 2024-11-01 PROCEDURE — 82746 ASSAY OF FOLIC ACID SERUM: CPT

## 2024-11-01 PROCEDURE — 83655 ASSAY OF LEAD: CPT

## 2024-11-01 PROCEDURE — 83825 ASSAY OF MERCURY: CPT

## 2024-11-01 PROCEDURE — 84155 ASSAY OF PROTEIN SERUM: CPT

## 2024-11-01 PROCEDURE — 36415 COLL VENOUS BLD VENIPUNCTURE: CPT

## 2024-11-01 PROCEDURE — 82607 VITAMIN B-12: CPT

## 2024-11-02 LAB
FOLATE SERPL-MCNC: >22.3 NG/ML
VIT B12 SERPL-MCNC: 1109 PG/ML (ref 180–914)

## 2024-11-05 LAB
ALBUMIN SERPL ELPH-MCNC: 4.45 G/DL (ref 3.2–5.1)
ALBUMIN SERPL ELPH-MCNC: 63.5 % (ref 48–70)
ALPHA1 GLOB SERPL ELPH-MCNC: 0.28 G/DL (ref 0.15–0.47)
ALPHA1 GLOB SERPL ELPH-MCNC: 4 % (ref 1.8–7)
ALPHA2 GLOB SERPL ELPH-MCNC: 0.62 G/DL (ref 0.42–1.04)
ALPHA2 GLOB SERPL ELPH-MCNC: 8.9 % (ref 5.9–14.9)
BETA GLOB ABNORMAL SERPL ELPH-MCNC: 0.41 G/DL (ref 0.31–0.57)
BETA1 GLOB SERPL ELPH-MCNC: 5.8 % (ref 4.7–7.7)
BETA2 GLOB SERPL ELPH-MCNC: 4.8 % (ref 3.1–7.9)
BETA2+GAMMA GLOB SERPL ELPH-MCNC: 0.34 G/DL (ref 0.2–0.58)
GAMMA GLOB ABNORMAL SERPL ELPH-MCNC: 0.91 G/DL (ref 0.4–1.66)
GAMMA GLOB SERPL ELPH-MCNC: 13 % (ref 6.9–22.3)
IGG/ALB SER: 1.74 {RATIO} (ref 1.1–1.8)
PROT PATTERN SERPL ELPH-IMP: NORMAL
PROT SERPL-MCNC: 7 G/DL (ref 6.4–8.2)

## 2024-11-05 PROCEDURE — 84165 PROTEIN E-PHORESIS SERUM: CPT | Performed by: STUDENT IN AN ORGANIZED HEALTH CARE EDUCATION/TRAINING PROGRAM

## 2024-11-06 LAB — VIT B1 BLD-SCNC: 100.6 NMOL/L (ref 66.5–200)

## 2024-11-07 LAB
ARSENIC BLD-MCNC: 3 UG/L (ref 0–9)
LEAD BLD-MCNC: 1.5 UG/DL (ref 0–3.4)
MERCURY BLD-MCNC: <1 UG/L (ref 0–14.9)

## 2024-12-05 NOTE — PROGRESS NOTES
Daily Note     Today's date: 2023  Patient name: Doreen Barroso  : 1960  MRN: [id number]  Referring provider: Chay Cool PA-C  Dx:   Encounter Diagnosis     ICD-10-CM    1. Closed torus fracture of distal end of left radius with routine healing, subsequent encounter  S52.522D       2. Aftercare following surgery  Z48.89           Start Time: 0900  Stop Time: 0950  Total time in clinic (min): 50 minutes    Subjective: Patient reports that the most of her pain is over the scar at her carpal tunnel and on her ulnar wrist. She reports that her wrist and hand are getting better little by little and she is able to use them more functionally.  Pain level: 4/10      Objective: See treatment diary below      Assessment: Tolerated treatment well. Patient exhibited good technique with therapeutic exercises and would benefit from continued OT. Scars are healing well with no sign of infection or adhesion. Dense palpable scar tissue just distal to carpal tunnel release site. Patient instructed in scar massage to this area and provide with Elastogel to protect and support this area during activity.      Plan: Continue per plan of care.  Progress treatment as tolerated.  Re-evaluation next session.     Precautions: ORIF 23. Cervical and lumbar fusions; hx R breast cancer         POC expires Unit limit Auth  expiration date PT/OT + Visit Limit?   3/1/24 NA 3/1/24 60 cy                 Visit/Unit Tracking  AUTH Status:  Date 12/5 12/8 12/11 12/14 12/19 12/22         Re due 24 Used 1 2 3 4 5 FOTO 6          Remaining                     Date 23   Visit  6 2 3 4 5   Manuals        Edema massage 8' 3' 5'     Scar massage 7' 5' 5' 8' 8'   compression        TG with elastogel Size C       Ortho fit/train        Neuro Re-Ed                                                                 Ther Ex        A/AAROM digits x30 x20 x20     A/AAROM wrist all planes  AAROM  10' AA 10' PROM 10' PROM e/f 10'  AROM e/f x20 ea   A/AAROM FA  AAROM 3' AA5' PROM 5'    Sustained grasp  Pencils 2 sets Pencils 2 sets Pencils 3 sets    Hook to full fist glides x30  Sm pen x 20 x30    1st webspace stretch     2'                   Ther Activity        Wrist maze x10 x10 x10 x10 x10   Towel crunches    x10 base of SF    Opposition Marbles 1 set oppose each finger    Colored beads 1/2 set oppose to each finger   Translation Buttons Colored beads 1 set Colored beads 1 set Colored beads 1 set Colored beads 1/2 set   Cones p/s 2 sets 2 sets   1 set   HEP  Scar massage                      Modalities        [facility] 5' 5' 5' 7' 5'                         Negative

## 2024-12-18 DIAGNOSIS — F51.01 PRIMARY INSOMNIA: ICD-10-CM

## 2024-12-19 RX ORDER — ZOLPIDEM TARTRATE 12.5 MG/1
12.5 TABLET, FILM COATED, EXTENDED RELEASE ORAL
Qty: 30 TABLET | Refills: 0 | Status: SHIPPED | OUTPATIENT
Start: 2024-12-19

## 2025-02-02 DIAGNOSIS — F51.01 PRIMARY INSOMNIA: ICD-10-CM

## 2025-02-02 DIAGNOSIS — I10 PRIMARY HYPERTENSION: ICD-10-CM

## 2025-02-02 DIAGNOSIS — J30.2 SEASONAL ALLERGIES: ICD-10-CM

## 2025-02-03 RX ORDER — ZOLPIDEM TARTRATE 12.5 MG/1
12.5 TABLET, FILM COATED, EXTENDED RELEASE ORAL
Qty: 30 TABLET | Refills: 0 | Status: SHIPPED | OUTPATIENT
Start: 2025-02-03

## 2025-02-03 RX ORDER — AZELASTINE HYDROCHLORIDE 137 UG/1
SPRAY, METERED NASAL
Qty: 30 ML | Refills: 2 | Status: SHIPPED | OUTPATIENT
Start: 2025-02-03

## 2025-02-03 RX ORDER — LOSARTAN POTASSIUM 100 MG/1
100 TABLET ORAL DAILY
Qty: 90 TABLET | Refills: 3 | Status: SHIPPED | OUTPATIENT
Start: 2025-02-03

## 2025-02-05 DIAGNOSIS — E03.9 ACQUIRED HYPOTHYROIDISM: ICD-10-CM

## 2025-02-05 RX ORDER — LEVOTHYROXINE SODIUM 112 UG/1
112 TABLET ORAL DAILY
Qty: 90 TABLET | Refills: 0 | Status: SHIPPED | OUTPATIENT
Start: 2025-02-05

## 2025-04-10 DIAGNOSIS — E03.9 ACQUIRED HYPOTHYROIDISM: ICD-10-CM

## 2025-04-14 RX ORDER — LEVOTHYROXINE SODIUM 112 UG/1
112 TABLET ORAL DAILY
Qty: 90 TABLET | Refills: 0 | Status: SHIPPED | OUTPATIENT
Start: 2025-04-14

## 2025-04-14 NOTE — TELEPHONE ENCOUNTER
She moved outside of the [place] area and is looking for a PCP in her new area. She asks if you could fill it this last time so she can get established with someone in her area.

## 2025-07-01 DIAGNOSIS — E03.9 ACQUIRED HYPOTHYROIDISM: ICD-10-CM

## 2025-07-02 RX ORDER — LEVOTHYROXINE SODIUM 112 UG/1
112 TABLET ORAL DAILY
Qty: 90 TABLET | Refills: 3 | OUTPATIENT
Start: 2025-07-02

## 2025-07-16 ENCOUNTER — TELEPHONE (OUTPATIENT)
Dept: FAMILY MEDICINE CLINIC | Facility: CLINIC | Age: 65
End: 2025-07-16

## 2025-07-16 NOTE — TELEPHONE ENCOUNTER
Left vm to see if she wants to schedule with this office. I received a fax from [facility] for a med refill (Norvasc Tab 10MG) which we can't fill till she see one of our providers

## (undated) DEVICE — CUFF TOURNIQUET 18 X 4 IN QUICK CONNECT DISP 1 BLADDER

## (undated) DEVICE — SCREW CORTICAL 3.2 X 14MM
Type: IMPLANTABLE DEVICE | Site: WRIST | Status: NON-FUNCTIONAL
Removed: 2023-11-14

## (undated) DEVICE — SCREW CORTICAL 2.3 X 26MM
Type: IMPLANTABLE DEVICE | Site: WRIST | Status: NON-FUNCTIONAL
Removed: 2023-11-14

## (undated) DEVICE — OCCLUSIVE GAUZE STRIP,3% BISMUTH TRIBROMOPHENATE IN PETROLATUM BLEND: Brand: XEROFORM

## (undated) DEVICE — GUIDE MINI 1.8MM

## (undated) DEVICE — TUBING SUCTION 5MM X 12 FT

## (undated) DEVICE — SKN PREP SPNG STKS PVP PNT STR: Brand: MEDLINE INDUSTRIES, INC.

## (undated) DEVICE — DRILL 1.8 X 90MM AKA

## (undated) DEVICE — NEEDLE 25G X 1 1/2

## (undated) DEVICE — DISPOSABLE EQUIPMENT COVER: Brand: SMALL TOWEL DRAPE

## (undated) DEVICE — DRAPE EQUIPMENT RF WAND

## (undated) DEVICE — PREMIUM DRY TRAY LF: Brand: MEDLINE INDUSTRIES, INC.

## (undated) DEVICE — GLOVE SRG BIOGEL 7.5

## (undated) DEVICE — STERILE BETHLEHEM PLASTIC HAND: Brand: CARDINAL HEALTH

## (undated) DEVICE — GLOVE INDICATOR PI UNDERGLOVE SZ 8 BLUE

## (undated) DEVICE — DRILL TWIST 2.3MM

## (undated) DEVICE — SUT PROLENE 4-0 PS-2 18 IN 8682G

## (undated) DEVICE — K-WIRE FIXATION 1.1 X 100MM SS
Type: IMPLANTABLE DEVICE | Site: WRIST | Status: NON-FUNCTIONAL
Removed: 2023-11-14

## (undated) DEVICE — DRAPE C-ARM X-RAY

## (undated) DEVICE — GAUZE SPONGES,16 PLY: Brand: CURITY